# Patient Record
Sex: MALE | Race: WHITE | Employment: FULL TIME | URBAN - METROPOLITAN AREA
[De-identification: names, ages, dates, MRNs, and addresses within clinical notes are randomized per-mention and may not be internally consistent; named-entity substitution may affect disease eponyms.]

---

## 2018-10-26 ENCOUNTER — APPOINTMENT (OUTPATIENT)
Dept: GENERAL RADIOLOGY | Age: 58
DRG: 872 | End: 2018-10-26
Attending: STUDENT IN AN ORGANIZED HEALTH CARE EDUCATION/TRAINING PROGRAM
Payer: COMMERCIAL

## 2018-10-26 ENCOUNTER — HOSPITAL ENCOUNTER (INPATIENT)
Age: 58
LOS: 7 days | Discharge: HOME HEALTH | DRG: 872 | End: 2018-11-02
Attending: STUDENT IN AN ORGANIZED HEALTH CARE EDUCATION/TRAINING PROGRAM | Admitting: INTERNAL MEDICINE
Payer: COMMERCIAL

## 2018-10-26 DIAGNOSIS — M86.9 OSTEOMYELITIS OF RIGHT FOOT, UNSPECIFIED TYPE (HCC): Primary | ICD-10-CM

## 2018-10-26 PROBLEM — N17.9 ACUTE KIDNEY INJURY (HCC): Status: ACTIVE | Noted: 2018-10-26

## 2018-10-26 PROBLEM — E11.610 CHARCOT FOOT DUE TO DIABETES MELLITUS (HCC): Status: ACTIVE | Noted: 2018-10-26

## 2018-10-26 LAB
ALBUMIN SERPL-MCNC: 3.5 G/DL (ref 3.5–5)
ALBUMIN/GLOB SERPL: 0.7 {RATIO}
ALP SERPL-CCNC: 58 U/L (ref 50–136)
ALT SERPL-CCNC: 25 U/L (ref 12–65)
ANION GAP SERPL CALC-SCNC: 10 MMOL/L
AST SERPL-CCNC: 21 U/L (ref 15–37)
BASOPHILS # BLD: 0.1 K/UL (ref 0–0.2)
BASOPHILS NFR BLD: 0 % (ref 0–2)
BILIRUB SERPL-MCNC: 0.4 MG/DL (ref 0.2–1.1)
BUN SERPL-MCNC: 28 MG/DL (ref 6–23)
CALCIUM SERPL-MCNC: 9.9 MG/DL (ref 8.3–10.4)
CHLORIDE SERPL-SCNC: 102 MMOL/L (ref 98–107)
CO2 SERPL-SCNC: 23 MMOL/L (ref 21–32)
CREAT SERPL-MCNC: 2.19 MG/DL (ref 0.8–1.5)
DIFFERENTIAL METHOD BLD: ABNORMAL
EOSINOPHIL # BLD: 0.1 K/UL (ref 0–0.8)
EOSINOPHIL NFR BLD: 0 % (ref 0.5–7.8)
ERYTHROCYTE [DISTWIDTH] IN BLOOD BY AUTOMATED COUNT: 14.9 %
GLOBULIN SER CALC-MCNC: 5.2 G/DL (ref 2.3–3.5)
GLUCOSE BLD STRIP.AUTO-MCNC: 135 MG/DL (ref 65–100)
GLUCOSE SERPL-MCNC: 139 MG/DL (ref 65–100)
HCT VFR BLD AUTO: 37.7 % (ref 41.1–50.3)
HGB BLD-MCNC: 12.2 G/DL (ref 13.6–17.2)
IMM GRANULOCYTES # BLD: 0.1 K/UL (ref 0–0.5)
IMM GRANULOCYTES NFR BLD AUTO: 0 % (ref 0–5)
LYMPHOCYTES # BLD: 0.6 K/UL (ref 0.5–4.6)
LYMPHOCYTES NFR BLD: 5 % (ref 13–44)
MCH RBC QN AUTO: 27.3 PG (ref 26.1–32.9)
MCHC RBC AUTO-ENTMCNC: 32.4 G/DL (ref 31.4–35)
MCV RBC AUTO: 84.3 FL (ref 79.6–97.8)
MONOCYTES # BLD: 1 K/UL (ref 0.1–1.3)
MONOCYTES NFR BLD: 9 % (ref 4–12)
NEUTS SEG # BLD: 9.4 K/UL (ref 1.7–8.2)
NEUTS SEG NFR BLD: 85 % (ref 43–78)
NRBC # BLD: 0 K/UL (ref 0–0.2)
PLATELET # BLD AUTO: 336 K/UL (ref 150–450)
PMV BLD AUTO: 9.3 FL (ref 9.4–12.3)
POTASSIUM SERPL-SCNC: 4.4 MMOL/L (ref 3.5–5.1)
PROT SERPL-MCNC: 8.7 G/DL
RBC # BLD AUTO: 4.47 M/UL (ref 4.23–5.6)
SODIUM SERPL-SCNC: 135 MMOL/L (ref 136–145)
WBC # BLD AUTO: 11.2 K/UL (ref 4.3–11.1)

## 2018-10-26 PROCEDURE — 85025 COMPLETE CBC W/AUTO DIFF WBC: CPT

## 2018-10-26 PROCEDURE — 74011250636 HC RX REV CODE- 250/636: Performed by: EMERGENCY MEDICINE

## 2018-10-26 PROCEDURE — 82962 GLUCOSE BLOOD TEST: CPT

## 2018-10-26 PROCEDURE — 80053 COMPREHEN METABOLIC PANEL: CPT

## 2018-10-26 PROCEDURE — 74011000258 HC RX REV CODE- 258: Performed by: STUDENT IN AN ORGANIZED HEALTH CARE EDUCATION/TRAINING PROGRAM

## 2018-10-26 PROCEDURE — 99284 EMERGENCY DEPT VISIT MOD MDM: CPT | Performed by: STUDENT IN AN ORGANIZED HEALTH CARE EDUCATION/TRAINING PROGRAM

## 2018-10-26 PROCEDURE — 77030020263 HC SOL INJ SOD CL0.9% LFCR 1000ML

## 2018-10-26 PROCEDURE — 96375 TX/PRO/DX INJ NEW DRUG ADDON: CPT | Performed by: STUDENT IN AN ORGANIZED HEALTH CARE EDUCATION/TRAINING PROGRAM

## 2018-10-26 PROCEDURE — 65270000029 HC RM PRIVATE

## 2018-10-26 PROCEDURE — 87077 CULTURE AEROBIC IDENTIFY: CPT

## 2018-10-26 PROCEDURE — 87040 BLOOD CULTURE FOR BACTERIA: CPT

## 2018-10-26 PROCEDURE — 81003 URINALYSIS AUTO W/O SCOPE: CPT | Performed by: STUDENT IN AN ORGANIZED HEALTH CARE EDUCATION/TRAINING PROGRAM

## 2018-10-26 PROCEDURE — 74011636637 HC RX REV CODE- 636/637: Performed by: INTERNAL MEDICINE

## 2018-10-26 PROCEDURE — 77030032490 HC SLV COMPR SCD KNE COVD -B

## 2018-10-26 PROCEDURE — 74011250636 HC RX REV CODE- 250/636: Performed by: STUDENT IN AN ORGANIZED HEALTH CARE EDUCATION/TRAINING PROGRAM

## 2018-10-26 PROCEDURE — 96361 HYDRATE IV INFUSION ADD-ON: CPT | Performed by: STUDENT IN AN ORGANIZED HEALTH CARE EDUCATION/TRAINING PROGRAM

## 2018-10-26 PROCEDURE — 96365 THER/PROPH/DIAG IV INF INIT: CPT | Performed by: STUDENT IN AN ORGANIZED HEALTH CARE EDUCATION/TRAINING PROGRAM

## 2018-10-26 PROCEDURE — 74011250636 HC RX REV CODE- 250/636: Performed by: INTERNAL MEDICINE

## 2018-10-26 PROCEDURE — 74011250637 HC RX REV CODE- 250/637: Performed by: INTERNAL MEDICINE

## 2018-10-26 PROCEDURE — 87186 SC STD MICRODIL/AGAR DIL: CPT

## 2018-10-26 PROCEDURE — 87205 SMEAR GRAM STAIN: CPT

## 2018-10-26 PROCEDURE — 73630 X-RAY EXAM OF FOOT: CPT

## 2018-10-26 RX ORDER — VALSARTAN 320 MG/1
300 TABLET ORAL
Status: DISCONTINUED | OUTPATIENT
Start: 2018-10-26 | End: 2018-10-26 | Stop reason: SDUPTHER

## 2018-10-26 RX ORDER — LEVOTHYROXINE SODIUM 75 UG/1
75 TABLET ORAL
Status: DISCONTINUED | OUTPATIENT
Start: 2018-10-27 | End: 2018-11-02 | Stop reason: HOSPADM

## 2018-10-26 RX ORDER — INSULIN LISPRO 100 [IU]/ML
INJECTION, SOLUTION INTRAVENOUS; SUBCUTANEOUS
Status: DISCONTINUED | OUTPATIENT
Start: 2018-10-26 | End: 2018-11-02 | Stop reason: HOSPADM

## 2018-10-26 RX ORDER — SODIUM CHLORIDE 0.9 % (FLUSH) 0.9 %
5-10 SYRINGE (ML) INJECTION EVERY 8 HOURS
Status: DISCONTINUED | OUTPATIENT
Start: 2018-10-26 | End: 2018-11-02 | Stop reason: HOSPADM

## 2018-10-26 RX ORDER — IRBESARTAN 300 MG/1
300 TABLET ORAL
COMMUNITY

## 2018-10-26 RX ORDER — HYDROCHLOROTHIAZIDE 25 MG/1
25 TABLET ORAL DAILY
COMMUNITY

## 2018-10-26 RX ORDER — FENOFIBRATE 160 MG/1
160 TABLET ORAL
Status: DISCONTINUED | OUTPATIENT
Start: 2018-10-27 | End: 2018-11-02 | Stop reason: HOSPADM

## 2018-10-26 RX ORDER — GUAIFENESIN 100 MG/5ML
81 LIQUID (ML) ORAL DAILY
Status: DISCONTINUED | OUTPATIENT
Start: 2018-10-27 | End: 2018-11-02 | Stop reason: HOSPADM

## 2018-10-26 RX ORDER — CALCIUM CARBONATE 200(500)MG
200 TABLET,CHEWABLE ORAL
Status: DISCONTINUED | OUTPATIENT
Start: 2018-10-26 | End: 2018-11-02 | Stop reason: HOSPADM

## 2018-10-26 RX ORDER — VANCOMYCIN 2 GRAM/500 ML IN 0.9 % SODIUM CHLORIDE INTRAVENOUS
2000 EVERY 12 HOURS
Status: DISCONTINUED | OUTPATIENT
Start: 2018-10-27 | End: 2018-10-31

## 2018-10-26 RX ORDER — VALSARTAN 320 MG/1
320 TABLET ORAL
Status: DISCONTINUED | OUTPATIENT
Start: 2018-10-26 | End: 2018-11-02 | Stop reason: HOSPADM

## 2018-10-26 RX ORDER — AMLODIPINE BESYLATE 10 MG/1
TABLET ORAL DAILY
COMMUNITY

## 2018-10-26 RX ORDER — SODIUM CHLORIDE 0.9 % (FLUSH) 0.9 %
5-10 SYRINGE (ML) INJECTION AS NEEDED
Status: DISCONTINUED | OUTPATIENT
Start: 2018-10-26 | End: 2018-11-02 | Stop reason: HOSPADM

## 2018-10-26 RX ORDER — PANTOPRAZOLE SODIUM 40 MG/1
40 TABLET, DELAYED RELEASE ORAL
Status: DISCONTINUED | OUTPATIENT
Start: 2018-10-27 | End: 2018-11-02 | Stop reason: HOSPADM

## 2018-10-26 RX ORDER — LEVOTHYROXINE SODIUM 75 UG/1
75 TABLET ORAL
COMMUNITY

## 2018-10-26 RX ORDER — ACETAMINOPHEN 325 MG/1
650 TABLET ORAL
Status: DISCONTINUED | OUTPATIENT
Start: 2018-10-26 | End: 2018-11-02 | Stop reason: HOSPADM

## 2018-10-26 RX ORDER — ONDANSETRON 2 MG/ML
4 INJECTION INTRAMUSCULAR; INTRAVENOUS
Status: DISCONTINUED | OUTPATIENT
Start: 2018-10-26 | End: 2018-11-02 | Stop reason: HOSPADM

## 2018-10-26 RX ORDER — SODIUM CHLORIDE 9 MG/ML
125 INJECTION, SOLUTION INTRAVENOUS CONTINUOUS
Status: DISCONTINUED | OUTPATIENT
Start: 2018-10-26 | End: 2018-10-30

## 2018-10-26 RX ORDER — AMLODIPINE BESYLATE 10 MG/1
10 TABLET ORAL DAILY
Status: DISCONTINUED | OUTPATIENT
Start: 2018-10-27 | End: 2018-11-02 | Stop reason: HOSPADM

## 2018-10-26 RX ADMIN — SODIUM CHLORIDE 500 ML: 900 INJECTION, SOLUTION INTRAVENOUS at 17:18

## 2018-10-26 RX ADMIN — SODIUM CHLORIDE 1000 ML: 900 INJECTION, SOLUTION INTRAVENOUS at 19:13

## 2018-10-26 RX ADMIN — CEFTRIAXONE SODIUM 2 G: 2 INJECTION, POWDER, FOR SOLUTION INTRAMUSCULAR; INTRAVENOUS at 18:49

## 2018-10-26 RX ADMIN — INSULIN HUMAN 7 UNITS: 100 INJECTION, SOLUTION PARENTERAL at 22:14

## 2018-10-26 RX ADMIN — VANCOMYCIN HYDROCHLORIDE 2500 MG: 10 INJECTION, POWDER, LYOPHILIZED, FOR SOLUTION INTRAVENOUS at 19:15

## 2018-10-26 RX ADMIN — INSULIN HUMAN 15 UNITS: 100 INJECTION, SUSPENSION SUBCUTANEOUS at 22:15

## 2018-10-26 RX ADMIN — VALSARTAN 320 MG: 320 TABLET, FILM COATED ORAL at 21:53

## 2018-10-26 RX ADMIN — ACETAMINOPHEN 650 MG: 325 TABLET ORAL at 22:17

## 2018-10-26 RX ADMIN — SODIUM CHLORIDE 75 ML/HR: 900 INJECTION, SOLUTION INTRAVENOUS at 22:07

## 2018-10-26 RX ADMIN — CALCIUM CARBONATE 200 MG: 500 TABLET, CHEWABLE ORAL at 22:17

## 2018-10-26 NOTE — ED NOTES
TRANSFER - OUT REPORT: 
 
Verbal report given to Perri KEYS on Osmar Larios  being transferred to [facility] for routine progression of care Report consisted of patients Situation, Background, Assessment and  
Recommendations(SBAR). Information from the following report(s) SBAR, ED Summary and MAR was reviewed with the receiving nurse. Lines:    
 
Opportunity for questions and clarification was provided. Patient transported with: 
 Registered Nurse
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
## 2018-10-26 NOTE — ED TRIAGE NOTES
Pt states he has a wound on his right foot, states he has been on abx since May, now having chills, ankle/foot swelling, foot pain.

## 2018-10-27 ENCOUNTER — APPOINTMENT (OUTPATIENT)
Dept: MRI IMAGING | Age: 58
DRG: 872 | End: 2018-10-27
Attending: NURSE PRACTITIONER
Payer: COMMERCIAL

## 2018-10-27 PROBLEM — N17.9 ACUTE ON CHRONIC RENAL FAILURE (HCC): Status: ACTIVE | Noted: 2018-10-27

## 2018-10-27 PROBLEM — N17.9 ACUTE KIDNEY INJURY (HCC): Status: RESOLVED | Noted: 2018-10-26 | Resolved: 2018-10-27

## 2018-10-27 PROBLEM — N18.9 ACUTE ON CHRONIC RENAL FAILURE (HCC): Status: ACTIVE | Noted: 2018-10-27

## 2018-10-27 LAB
ANION GAP SERPL CALC-SCNC: 10 MMOL/L
BUN SERPL-MCNC: 26 MG/DL (ref 6–23)
CALCIUM SERPL-MCNC: 8.9 MG/DL (ref 8.3–10.4)
CHLORIDE SERPL-SCNC: 106 MMOL/L (ref 98–107)
CO2 SERPL-SCNC: 22 MMOL/L (ref 21–32)
CREAT SERPL-MCNC: 1.97 MG/DL (ref 0.8–1.5)
ERYTHROCYTE [DISTWIDTH] IN BLOOD BY AUTOMATED COUNT: 15.1 %
GLUCOSE BLD STRIP.AUTO-MCNC: 239 MG/DL (ref 65–100)
GLUCOSE BLD STRIP.AUTO-MCNC: 240 MG/DL (ref 65–100)
GLUCOSE BLD STRIP.AUTO-MCNC: 258 MG/DL (ref 65–100)
GLUCOSE SERPL-MCNC: 179 MG/DL (ref 65–100)
HCT VFR BLD AUTO: 32.7 % (ref 41.1–50.3)
HGB BLD-MCNC: 10.5 G/DL (ref 13.6–17.2)
MCH RBC QN AUTO: 27.3 PG (ref 26.1–32.9)
MCHC RBC AUTO-ENTMCNC: 32.1 G/DL (ref 31.4–35)
MCV RBC AUTO: 85.2 FL (ref 79.6–97.8)
NRBC # BLD: 0 K/UL (ref 0–0.2)
PLATELET # BLD AUTO: 268 K/UL (ref 150–450)
PMV BLD AUTO: 9.4 FL (ref 9.4–12.3)
POTASSIUM SERPL-SCNC: 4.3 MMOL/L (ref 3.5–5.1)
RBC # BLD AUTO: 3.84 M/UL (ref 4.23–5.6)
SODIUM SERPL-SCNC: 138 MMOL/L (ref 136–145)
WBC # BLD AUTO: 13.6 K/UL (ref 4.3–11.1)

## 2018-10-27 PROCEDURE — 74011636637 HC RX REV CODE- 636/637: Performed by: INTERNAL MEDICINE

## 2018-10-27 PROCEDURE — 74011250636 HC RX REV CODE- 250/636: Performed by: INTERNAL MEDICINE

## 2018-10-27 PROCEDURE — 77030020263 HC SOL INJ SOD CL0.9% LFCR 1000ML

## 2018-10-27 PROCEDURE — 80048 BASIC METABOLIC PNL TOTAL CA: CPT

## 2018-10-27 PROCEDURE — 73720 MRI LWR EXTREMITY W/O&W/DYE: CPT

## 2018-10-27 PROCEDURE — 36415 COLL VENOUS BLD VENIPUNCTURE: CPT

## 2018-10-27 PROCEDURE — 65270000029 HC RM PRIVATE

## 2018-10-27 PROCEDURE — 85027 COMPLETE CBC AUTOMATED: CPT

## 2018-10-27 PROCEDURE — A9575 INJ GADOTERATE MEGLUMI 0.1ML: HCPCS | Performed by: INTERNAL MEDICINE

## 2018-10-27 PROCEDURE — 74011250637 HC RX REV CODE- 250/637: Performed by: INTERNAL MEDICINE

## 2018-10-27 PROCEDURE — 74011000258 HC RX REV CODE- 258: Performed by: INTERNAL MEDICINE

## 2018-10-27 PROCEDURE — 82962 GLUCOSE BLOOD TEST: CPT

## 2018-10-27 RX ORDER — GADOTERATE MEGLUMINE 376.9 MG/ML
30 INJECTION INTRAVENOUS
Status: COMPLETED | OUTPATIENT
Start: 2018-10-27 | End: 2018-10-27

## 2018-10-27 RX ORDER — HEPARIN SODIUM 5000 [USP'U]/ML
5000 INJECTION, SOLUTION INTRAVENOUS; SUBCUTANEOUS EVERY 8 HOURS
Status: DISCONTINUED | OUTPATIENT
Start: 2018-10-27 | End: 2018-11-02 | Stop reason: HOSPADM

## 2018-10-27 RX ORDER — SODIUM CHLORIDE 0.9 % (FLUSH) 0.9 %
10 SYRINGE (ML) INJECTION
Status: COMPLETED | OUTPATIENT
Start: 2018-10-27 | End: 2018-10-27

## 2018-10-27 RX ORDER — HYDRALAZINE HYDROCHLORIDE 20 MG/ML
20 INJECTION INTRAMUSCULAR; INTRAVENOUS
Status: DISCONTINUED | OUTPATIENT
Start: 2018-10-27 | End: 2018-11-02 | Stop reason: HOSPADM

## 2018-10-27 RX ADMIN — HEPARIN SODIUM 5000 UNITS: 5000 INJECTION INTRAVENOUS; SUBCUTANEOUS at 17:50

## 2018-10-27 RX ADMIN — FENOFIBRATE 160 MG: 160 TABLET ORAL at 09:04

## 2018-10-27 RX ADMIN — Medication 10 ML: at 17:12

## 2018-10-27 RX ADMIN — CEFTRIAXONE SODIUM 2 G: 2 INJECTION, POWDER, FOR SOLUTION INTRAMUSCULAR; INTRAVENOUS at 18:01

## 2018-10-27 RX ADMIN — AMLODIPINE BESYLATE 10 MG: 10 TABLET ORAL at 09:04

## 2018-10-27 RX ADMIN — PANTOPRAZOLE SODIUM 40 MG: 40 TABLET, DELAYED RELEASE ORAL at 09:04

## 2018-10-27 RX ADMIN — SODIUM CHLORIDE 125 ML/HR: 900 INJECTION, SOLUTION INTRAVENOUS at 19:36

## 2018-10-27 RX ADMIN — VALSARTAN 320 MG: 320 TABLET, FILM COATED ORAL at 20:45

## 2018-10-27 RX ADMIN — VANCOMYCIN HYDROCHLORIDE 2000 MG: 10 INJECTION, POWDER, LYOPHILIZED, FOR SOLUTION INTRAVENOUS at 09:01

## 2018-10-27 RX ADMIN — HEPARIN SODIUM 5000 UNITS: 5000 INJECTION INTRAVENOUS; SUBCUTANEOUS at 09:41

## 2018-10-27 RX ADMIN — ASPIRIN 81 MG CHEWABLE TABLET 81 MG: 81 TABLET CHEWABLE at 09:04

## 2018-10-27 RX ADMIN — INSULIN LISPRO 4 UNITS: 100 INJECTION, SOLUTION INTRAVENOUS; SUBCUTANEOUS at 17:50

## 2018-10-27 RX ADMIN — INSULIN HUMAN 35 UNITS: 100 INJECTION, SUSPENSION SUBCUTANEOUS at 17:50

## 2018-10-27 RX ADMIN — LEVOTHYROXINE SODIUM 75 MCG: 75 TABLET ORAL at 09:04

## 2018-10-27 RX ADMIN — INSULIN HUMAN 35 UNITS: 100 INJECTION, SUSPENSION SUBCUTANEOUS at 09:30

## 2018-10-27 RX ADMIN — SODIUM CHLORIDE 125 ML/HR: 900 INJECTION, SOLUTION INTRAVENOUS at 12:14

## 2018-10-27 RX ADMIN — VANCOMYCIN HYDROCHLORIDE 2000 MG: 10 INJECTION, POWDER, LYOPHILIZED, FOR SOLUTION INTRAVENOUS at 19:39

## 2018-10-27 RX ADMIN — GADOTERATE MEGLUMINE 30 ML: 376.9 INJECTION INTRAVENOUS at 17:12

## 2018-10-27 RX ADMIN — INSULIN LISPRO 4 UNITS: 100 INJECTION, SOLUTION INTRAVENOUS; SUBCUTANEOUS at 20:46

## 2018-10-27 RX ADMIN — INSULIN LISPRO 6 UNITS: 100 INJECTION, SOLUTION INTRAVENOUS; SUBCUTANEOUS at 11:38

## 2018-10-27 NOTE — PROGRESS NOTES
Orthopaedic NP made rounds this morning. Changed diet to consistent carb diet. Referral sent for wound care.

## 2018-10-27 NOTE — PROGRESS NOTES
TRANSFER - IN REPORT: 
 
Verbal report received from Arlene Waddell RN on Yu Mims  being received from ER(unit) for routine progression of care Report consisted of patients Situation, Background, Assessment and  
Recommendations(SBAR). Information from the following report(s) ED Summary, Intake/Output and Recent Results was reviewed with the receiving nurse. Opportunity for questions and clarification was provided. Assessment completed upon patients arrival to unit and care assumed.
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
## 2018-10-27 NOTE — PROGRESS NOTES
Report called from Radha Benavidez, [facility] from emergency room. Patient to be transported soon.

## 2018-10-27 NOTE — PROGRESS NOTES
Patient resting in bed. Alert and oriented x4. PIV infusing fluids and antibiotics. NPO except meds with sips of water. Lung sounds clear. Bowel sounds active. Right foot has a wound that's draining serosanguinous fluid. Dressing placed on right with 4x4 and kerlix. No needs at this time. Bed is low and locked. Call light within reach. Instructed to call for assistance.

## 2018-10-27 NOTE — PROGRESS NOTES
Hospitalist Progress Note Admit Date:  10/26/2018  5:05 PM  
Name:  Tuyet Street Age:  62 y.o. 
:  1960 MRN:  [id number] PCP:  Sourav Dickinson MD 
Treatment Team: Attending Provider: Tej Jean DO; Consulting Provider: Grecia Parsons MD 
 
Subjective:  
Patient pleasant 58M with pmhx of DM, HTN, MO, sleep apnea, hypothyroidism presented with less than one day of subjective fever, chills, and increased right foot swelling and redness. Says he felt in his normal state of health until when he checked fever at home of 100. Patient with hx of Charcot foot and chronic ulceration, follows with outpatient Wound care, Dr Jennyfer Barreto and Dr Rogelio Jackson. Has been on bactrim for several months. XR  With significant bone destruction of midfoot and hindfoot. Was given vanco/Rocephin and cultured. Hospitalist asked to admit for sepsis, diabetic foot ulceration/suspected osteomyelelitis. 10/27 - pt denies complaints except for foot pain when he walks. No CP, SOB, fevers. Objective:  
 
Patient Vitals for the past 24 hrs: 
 Temp Pulse Resp BP SpO2  
10/27/18 0300 98.1 °F (36.7 °C) 80 18 162/81 98 % 10/27/18 0047 97.4 °F (36.3 °C) 75 18 131/73 95 % 10/26/18 2012 99.3 °F (37.4 °C) 94 18 167/78 94 % 10/26/18 1906  (!) 108 18 147/59 93 % 10/26/18 1713 99.6 °F (37.6 °C) (!) 112 22 159/76 97 % Oxygen Therapy O2 Sat (%): 98 % (10/27/18 0300) Pulse via Oximetry: 108 beats per minute (10/26/18 190) O2 Device: Room air (10/26/18 1906) Intake/Output Summary (Last 24 hours) at 10/27/2018 5674 Last data filed at 10/27/2018 2551 Gross per 24 hour Intake 824 ml Output 1300 ml Net -476 ml General:    Well nourished. Alert. CV:   RRR. No murmur, rub, or gallop. Lungs:   CTAB. No wheezing, rhonchi, or rales. Extremities: Warm and dry. No cyanosis. Chronic stasis edema with derm changes. R foot bandaged Skin:     No rashes or jaundice. Neuro:  No gross focal deficits Data Review: 
I have reviewed all labs, meds, telemetry events, and studies from the last 24 hours: 
 
Recent Results (from the past 24 hour(s)) CBC WITH AUTOMATED DIFF Collection Time: 10/26/18  5:46 PM  
Result Value Ref Range WBC 11.2 (H) 4.3 - 11.1 K/uL  
 RBC 4.47 4.23 - 5.6 M/uL  
 HGB 12.2 (L) 13.6 - 17.2 g/dL HCT 37.7 (L) 41.1 - 50.3 % MCV 84.3 79.6 - 97.8 FL  
 MCH 27.3 26.1 - 32.9 PG  
 MCHC 32.4 31.4 - 35.0 g/dL  
 RDW 14.9 % PLATELET 295 065 - 653 K/uL MPV 9.3 (L) 9.4 - 12.3 FL ABSOLUTE NRBC 0.00 0.0 - 0.2 K/uL  
 DF AUTOMATED NEUTROPHILS 85 (H) 43 - 78 % LYMPHOCYTES 5 (L) 13 - 44 % MONOCYTES 9 4.0 - 12.0 % EOSINOPHILS 0 (L) 0.5 - 7.8 % BASOPHILS 0 0.0 - 2.0 % IMMATURE GRANULOCYTES 0 0.0 - 5.0 %  
 ABS. NEUTROPHILS 9.4 (H) 1.7 - 8.2 K/UL  
 ABS. LYMPHOCYTES 0.6 0.5 - 4.6 K/UL  
 ABS. MONOCYTES 1.0 0.1 - 1.3 K/UL  
 ABS. EOSINOPHILS 0.1 0.0 - 0.8 K/UL  
 ABS. BASOPHILS 0.1 0.0 - 0.2 K/UL  
 ABS. IMM. GRANS. 0.1 0.0 - 0.5 K/UL METABOLIC PANEL, COMPREHENSIVE Collection Time: 10/26/18  5:46 PM  
Result Value Ref Range Sodium 135 (L) 136 - 145 mmol/L Potassium 4.4 3.5 - 5.1 mmol/L Chloride 102 98 - 107 mmol/L  
 CO2 23 21 - 32 mmol/L Anion gap 10 mmol/L Glucose 139 (H) 65 - 100 mg/dL BUN 28 (H) 6 - 23 MG/DL Creatinine 2.19 (H) 0.8 - 1.5 MG/DL  
 GFR est AA 40 (L) >60 ml/min/1.73m2 GFR est non-AA 33 ml/min/1.73m2 Calcium 9.9 8.3 - 10.4 MG/DL Bilirubin, total 0.4 0.2 - 1.1 MG/DL  
 ALT (SGPT) 25 12 - 65 U/L  
 AST (SGOT) 21 15 - 37 U/L Alk. phosphatase 58 50 - 136 U/L Protein, total 8.7 g/dL Albumin 3.5 3.5 - 5.0 g/dL Globulin 5.2 (H) 2.3 - 3.5 g/dL A-G Ratio 0.7 CULTURE, BLOOD Collection Time: 10/26/18  5:46 PM  
Result Value Ref Range Special Requests: RIGHT ANTECUBITAL Culture result: NO GROWTH AFTER 13 HOURS    
CULTURE, BLOOD  Collection Time: 10/26/18  5:46 PM  
 Result Value Ref Range Special Requests: RIGHT FOREARM Culture result: NO GROWTH AFTER 13 HOURS    
GLUCOSE, POC Collection Time: 10/26/18  8:33 PM  
Result Value Ref Range Glucose (POC) 135 (H) 65 - 100 mg/dL METABOLIC PANEL, BASIC Collection Time: 10/27/18  4:38 AM  
Result Value Ref Range Sodium 138 136 - 145 mmol/L Potassium 4.3 3.5 - 5.1 mmol/L Chloride 106 98 - 107 mmol/L  
 CO2 22 21 - 32 mmol/L Anion gap 10 mmol/L Glucose 179 (H) 65 - 100 mg/dL BUN 26 (H) 6 - 23 MG/DL Creatinine 1.97 (H) 0.8 - 1.5 MG/DL  
 GFR est AA 45 (L) >60 ml/min/1.73m2 GFR est non-AA 37 ml/min/1.73m2 Calcium 8.9 8.3 - 10.4 MG/DL  
CBC W/O DIFF Collection Time: 10/27/18  4:38 AM  
Result Value Ref Range WBC 13.6 (H) 4.3 - 11.1 K/uL  
 RBC 3.84 (L) 4.23 - 5.6 M/uL  
 HGB 10.5 (L) 13.6 - 17.2 g/dL HCT 32.7 (L) 41.1 - 50.3 % MCV 85.2 79.6 - 97.8 FL  
 MCH 27.3 26.1 - 32.9 PG  
 MCHC 32.1 31.4 - 35.0 g/dL  
 RDW 15.1 % PLATELET 688 575 - 278 K/uL MPV 9.4 9.4 - 12.3 FL ABSOLUTE NRBC 0.00 0.0 - 0.2 K/uL All Micro Results Procedure Component Value Units Date/Time CULTURE, BLOOD [[id number]] Collected:  10/26/18 1746 Order Status:  Completed Specimen:  Blood Updated:  10/27/18 8421 Special Requests: RIGHT ANTECUBITAL Culture result: NO GROWTH AFTER 13 HOURS     
 CULTURE, BLOOD [[id number]] Collected:  10/26/18 1746 Order Status:  Completed Specimen:  Blood Updated:  10/27/18 9667 Special Requests: RIGHT FOREARM Culture result: NO GROWTH AFTER 13 HOURS No results found for this visit on 10/26/18. Current Meds: 
Current Facility-Administered Medications Medication Dose Route Frequency  influenza vaccine 2018-19 (6 mos+)(PF) (FLUARIX QUAD/FLULAVAL QUAD) injection 0.5 mL  0.5 mL IntraMUSCular PRIOR TO DISCHARGE  hydrALAZINE (APRESOLINE) 20 mg/mL injection 20 mg  20 mg IntraVENous Q4H PRN  
  tuberculin injection 5 Units  5 Units IntraDERMal ONCE  
 heparin (porcine) injection 5,000 Units  5,000 Units SubCUTAneous Q8H  
 amLODIPine (NORVASC) tablet 10 mg  10 mg Oral DAILY  aspirin chewable tablet 81 mg  81 mg Oral DAILY  fenofibrate (LOFIBRA) tablet 160 mg  160 mg Oral 7am  
 levothyroxine (SYNTHROID) tablet 75 mcg  75 mcg Oral ACB  pantoprazole (PROTONIX) tablet 40 mg  40 mg Oral ACB  insulin lispro (HUMALOG) injection   SubCUTAneous AC&HS  sodium chloride (NS) flush 5-10 mL  5-10 mL IntraVENous Q8H  
 sodium chloride (NS) flush 5-10 mL  5-10 mL IntraVENous PRN  
 acetaminophen (TYLENOL) tablet 650 mg  650 mg Oral Q4H PRN  
 ondansetron (ZOFRAN) injection 4 mg  4 mg IntraVENous Q4H PRN  
 0.9% sodium chloride infusion  125 mL/hr IntraVENous CONTINUOUS  
 insulin NPH (NOVOLIN N, HUMULIN N) injection 35 Units  35 Units SubCUTAneous ACB&D And  
 insulin regular (NOVOLIN R, HUMULIN R) injection 15 Units  15 Units SubCUTAneous ACB&D  cefTRIAXone (ROCEPHIN) 2 g in 0.9% sodium chloride (MBP/ADV) 50 mL  2 g IntraVENous Q24H  calcium carbonate (TUMS) chewable tablet 200 mg [elemental]  200 mg Oral TID PRN  
 valsartan (DIOVAN) tablet 320 mg  320 mg Oral QHS  vancomycin (VANCOCIN) 2000 mg in  ml infusion  2,000 mg IntraVENous Q12H Other Studies (last 24 hours): Xr Foot Rt Min 3 V Result Date: 10/26/2018 Right Foot INDICATION: Right foot pain, wound, diabetic Three views of the right foot were obtained FINDINGS: There is marked destruction and deformity of the midfoot and adjacent hindfoot. There is consistent with a neuropathic process, possibly with associated osteomyelitis. The talus is disc placed medially with tibia articulating directly with the calcaneus. There is partial destruction of the cuneiforms, cuboid, talus, and calcaneus. IMPRESSION: Significant bone destruction in the midfoot and hindfoot. Assessment and Plan: Hospital Problems as of 10/27/2018 Date Reviewed: 10/27/2018 Codes Class Noted - Resolved POA Acute on chronic renal failure (HCC) ICD-10-CM: N17.9, N18.9 ICD-9-CM: 584.9, 585.9  10/27/2018 - Present Yes Charcot foot due to diabetes mellitus ([facility] ICD-10-CM: E11.610 ICD-9-CM: 250.60, 713.5  10/26/2018 - Present Yes Osteomyelitis ([facility] ICD-10-CM: M86.9 ICD-9-CM: 730.20  7/22/2015 - Present Yes * (Principal) Sepsis ([facility] ICD-10-CM: A41.9 ICD-9-CM: 038.9, 995.91  7/22/2015 - Present Yes DM type 2 (diabetes mellitus, type 2) ([facility]) (Chronic) ICD-10-CM: E11.9 ICD-9-CM: 250.00  7/22/2015 - Present Yes RESOLVED: Acute kidney injury ([facility] ICD-10-CM: N17.9 ICD-9-CM: 584.9  10/26/2018 - 10/27/2018 Plan: 
Sepsis due to DM R foot infection with possible osteo -  
· Cont vanc/rocephin for now. Ortho consulted. · Is NPO in case of procedure · Follow cultures AoCKD · Cont IVF. Is NPO. Increase today. Monitor. DM2 
· Cont home basal 70/30 regimen and ISS 
 
HTN 
· Monitor. Cont home meds. May need tighter control DC planning/Dispo:  PPD ordered. Diet:  DIET NPO 
DVT ppx:  heparin Signed: 
Jayson Rooney MD
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
## 2018-10-27 NOTE — PROGRESS NOTES
Pharmacokinetic Consult to Pharmacist 
 
Tonie Delaney is a 62 y.o. male being treated for right diabetic foot infection with ceftriaxone and vancomycin. Height: 6' (182.9 cm)  Weight: (!) 172.4 kg (380 lb) Lab Results Component Value Date/Time BUN 26 (H) 10/27/2018 04:38 AM  
 Creatinine 1.97 (H) 10/27/2018 04:38 AM  
 WBC 13.6 (H) 10/27/2018 04:38 AM  
 Lactic acid 1.8 07/21/2015 10:44 PM  
  
Estimated Creatinine Clearance: 66.8 mL/min (A) (based on SCr of 1.97 mg/dL (H)). CULTURES: 
10/26 :  BC x 2 - NG 
 
 
 
Day 2 of vancomycin. Goal trough is 15-20. [facility] initiated therapy with vancomycin 2500mg x 1 followed by vancomycin 2g q12h. We will continue to follow the  Patient and adjust the dose as necessary per guidelines. Thank you, Jonah Pelayo, OlegD

## 2018-10-27 NOTE — PROGRESS NOTES
62 M dx sepsis, diabetic foot ulceration. hx of Charcot foot and chronic ulceration, follows with outpatient Wound care, Dr Venkat Mccrary and Dr Elise Dakins. On IV ABX.

## 2018-10-27 NOTE — PROGRESS NOTES
[facility] paged for consult to orthopedic, Dr. Angel Hernandez will be making rounds in the morning to see patient.

## 2018-10-27 NOTE — CONSULTS
ORTHO:    CONSULT RECEIVED    PATIENT WITH HISTORY OF CHARCOT JOINT, DIABETIC ULERATONS    TREATED BY DR Janice Teixeira AND DR GUERRERO AS OUTPATIENT AT WOUND CENTER    XRAYS REVIEWED     MRII RIGHT FOOT ORDERED

## 2018-10-27 NOTE — PROGRESS NOTES
Skin assessment completed with Mercedes Glynn RN. Patient has right foot deformity. Right midfoot and hindfoot ulceration with serosangenous drainage, surrounded by erythema and swelling. Left foot has 4th and 5th toes amputated.

## 2018-10-28 PROBLEM — E11.610 CHARCOT FOOT DUE TO DIABETES MELLITUS (HCC): Chronic | Status: ACTIVE | Noted: 2018-10-26

## 2018-10-28 LAB
GLUCOSE BLD STRIP.AUTO-MCNC: 123 MG/DL (ref 65–100)
GLUCOSE BLD STRIP.AUTO-MCNC: 130 MG/DL (ref 65–100)
GLUCOSE BLD STRIP.AUTO-MCNC: 174 MG/DL (ref 65–100)
GLUCOSE BLD STRIP.AUTO-MCNC: 214 MG/DL (ref 65–100)
VANCOMYCIN TROUGH SERPL-MCNC: 16 UG/ML (ref 5–20)

## 2018-10-28 PROCEDURE — 36415 COLL VENOUS BLD VENIPUNCTURE: CPT

## 2018-10-28 PROCEDURE — 74011250636 HC RX REV CODE- 250/636: Performed by: INTERNAL MEDICINE

## 2018-10-28 PROCEDURE — 80202 ASSAY OF VANCOMYCIN: CPT

## 2018-10-28 PROCEDURE — 77030020263 HC SOL INJ SOD CL0.9% LFCR 1000ML

## 2018-10-28 PROCEDURE — 74011250637 HC RX REV CODE- 250/637: Performed by: INTERNAL MEDICINE

## 2018-10-28 PROCEDURE — 65270000029 HC RM PRIVATE

## 2018-10-28 PROCEDURE — 74011636637 HC RX REV CODE- 636/637: Performed by: INTERNAL MEDICINE

## 2018-10-28 PROCEDURE — 82962 GLUCOSE BLOOD TEST: CPT

## 2018-10-28 PROCEDURE — 87040 BLOOD CULTURE FOR BACTERIA: CPT

## 2018-10-28 PROCEDURE — 74011000258 HC RX REV CODE- 258: Performed by: INTERNAL MEDICINE

## 2018-10-28 RX ORDER — INSULIN LISPRO 100 [IU]/ML
30 INJECTION, SOLUTION INTRAVENOUS; SUBCUTANEOUS
Status: DISCONTINUED | OUTPATIENT
Start: 2018-10-28 | End: 2018-10-29

## 2018-10-28 RX ORDER — INSULIN GLARGINE 100 [IU]/ML
50 INJECTION, SOLUTION SUBCUTANEOUS DAILY
Status: DISCONTINUED | OUTPATIENT
Start: 2018-10-28 | End: 2018-10-29

## 2018-10-28 RX ADMIN — HEPARIN SODIUM 5000 UNITS: 5000 INJECTION INTRAVENOUS; SUBCUTANEOUS at 01:22

## 2018-10-28 RX ADMIN — FENOFIBRATE 160 MG: 160 TABLET ORAL at 06:45

## 2018-10-28 RX ADMIN — SODIUM CHLORIDE 125 ML/HR: 900 INJECTION, SOLUTION INTRAVENOUS at 06:44

## 2018-10-28 RX ADMIN — Medication 5 ML: at 22:34

## 2018-10-28 RX ADMIN — LEVOTHYROXINE SODIUM 75 MCG: 75 TABLET ORAL at 06:45

## 2018-10-28 RX ADMIN — INSULIN LISPRO 30 UNITS: 100 INJECTION, SOLUTION INTRAVENOUS; SUBCUTANEOUS at 18:20

## 2018-10-28 RX ADMIN — HEPARIN SODIUM 5000 UNITS: 5000 INJECTION INTRAVENOUS; SUBCUTANEOUS at 08:01

## 2018-10-28 RX ADMIN — VANCOMYCIN HYDROCHLORIDE 2000 MG: 10 INJECTION, POWDER, LYOPHILIZED, FOR SOLUTION INTRAVENOUS at 20:03

## 2018-10-28 RX ADMIN — INSULIN LISPRO 30 UNITS: 100 INJECTION, SOLUTION INTRAVENOUS; SUBCUTANEOUS at 12:18

## 2018-10-28 RX ADMIN — HEPARIN SODIUM 5000 UNITS: 5000 INJECTION INTRAVENOUS; SUBCUTANEOUS at 18:19

## 2018-10-28 RX ADMIN — INSULIN GLARGINE 50 UNITS: 100 INJECTION, SOLUTION SUBCUTANEOUS at 12:17

## 2018-10-28 RX ADMIN — PANTOPRAZOLE SODIUM 40 MG: 40 TABLET, DELAYED RELEASE ORAL at 06:45

## 2018-10-28 RX ADMIN — VANCOMYCIN HYDROCHLORIDE 2000 MG: 10 INJECTION, POWDER, LYOPHILIZED, FOR SOLUTION INTRAVENOUS at 08:01

## 2018-10-28 RX ADMIN — SODIUM CHLORIDE 125 ML/HR: 900 INJECTION, SOLUTION INTRAVENOUS at 18:39

## 2018-10-28 RX ADMIN — INSULIN HUMAN 35 UNITS: 100 INJECTION, SUSPENSION SUBCUTANEOUS at 08:01

## 2018-10-28 RX ADMIN — INSULIN LISPRO 4 UNITS: 100 INJECTION, SOLUTION INTRAVENOUS; SUBCUTANEOUS at 12:18

## 2018-10-28 RX ADMIN — CEFTRIAXONE SODIUM 2 G: 2 INJECTION, POWDER, FOR SOLUTION INTRAMUSCULAR; INTRAVENOUS at 18:38

## 2018-10-28 RX ADMIN — HEPARIN SODIUM 5000 UNITS: 5000 INJECTION INTRAVENOUS; SUBCUTANEOUS at 23:32

## 2018-10-28 RX ADMIN — VALSARTAN 320 MG: 320 TABLET, FILM COATED ORAL at 21:48

## 2018-10-28 RX ADMIN — ASPIRIN 81 MG CHEWABLE TABLET 81 MG: 81 TABLET CHEWABLE at 08:02

## 2018-10-28 RX ADMIN — INSULIN LISPRO 2 UNITS: 100 INJECTION, SOLUTION INTRAVENOUS; SUBCUTANEOUS at 08:01

## 2018-10-28 RX ADMIN — AMLODIPINE BESYLATE 10 MG: 10 TABLET ORAL at 08:02

## 2018-10-28 NOTE — PROGRESS NOTES
House supervisor notified that nurse could not get ahold of wound care. Supervisor states wound care does not work on the weekends

## 2018-10-28 NOTE — PROGRESS NOTES
Care Management Interventions Mode of Transport at Discharge: Self Transition of Care Consult (CM Consult): Discharge Planning Current Support Network: Lives Alone Discharge Location Discharge Placement: Unable to determine at this time SW following to assist with d/c plans. Chart reviewed and patient interview attempted. Patient asleep so I will interview at a later time. Patient's prior SW note from hospitalization 3 yrs ago showed him living in a /[place]. He lived in [place], [place] but he travelled here to [facility] to work weekdays and would go back to [place] on weekends. [place] shows [place]. Pt was not  in 2015 and had no local family. Pt's mother and other family all live in [place]. Pt admited to being non compliant at that time and doing what he wants with most things in life. Bariatric crutches were arranged during that admission since he refused to use a walker due to the limited space in the [facility].  SW to follow for possible long term IVAB. Patient has pvt ins that may cover the cost if needed. Will follow. Gini Mo

## 2018-10-28 NOTE — PROGRESS NOTES
2018 Post Op day: * No surgery found * Admit Date: 10/26/2018 Admit Diagnosis: Osteomyelitis ([address] Principle Problem: Sepsis ([address] Subjective: Doing well, No complaints, No SOB, No Chest Pain, No Nausea or Vomiting Objective:  
Vital Signs are Stable, No Acute Distress, Alert and Oriented, Dressing is Dry,  Neurovascular exam is normal.  
 
Assessment / Plan : 
Patient Active Problem List  
Diagnosis Code  Osteomyelitis ([address] M86.9  Sepsis ([address] A41.9  DM type 2 (diabetes mellitus, type 2) ([address] E11.9  Charcot foot due to diabetes mellitus ([address] E11.610  
 Acute on chronic renal failure (HCC) N17.9, N18.9 Patient Vitals for the past 8 hrs: 
 BP Temp Pulse Resp SpO2  
10/28/18 0440 145/73 98.4 °F (36.9 °C) 83 18 96 % 10/28/18 0059 115/69 98.4 °F (36.9 °C) 81 18 93 % Temp (24hrs), Av.6 °F (37 °C), Min:97.3 °F (36.3 °C), Max:99.7 °F (37.6 °C) Body mass index is 51.54 kg/m². Lab Results Component Value Date/Time HGB 10.5 (L) 10/27/2018 04:38 AM  
  
Pt discussed with Supervising Physician Wound care has not seen the patient yet, unsure why they did not see him yesterday when we placed the consult. Re consult Signed By: ERVIN Yi 
10/28/2018,  8:48 AM

## 2018-10-28 NOTE — PROGRESS NOTES
Shift assessment completed. Patient alert and oriented x4. Patient in bed resting quietly while watching tv. No distress noted. Dressing to right foot is clean, dry and intact. Patient uses urinal to void. Incentive spirometry at bedside. Patient demonstrated use at 2500. Tolerated well. Bed low and locked. Call light within reach of patient. Patient instructed to call for assistance. Patient verbalized understanding. Will monitor.

## 2018-10-28 NOTE — PROGRESS NOTES
Hospitalist Progress Note Admit Date:  10/26/2018  5:05 PM  
Name:  Thurnell Fothergill Age:  62 y.o. 
:  1960 MRN:  [id number] PCP:  Estrella Montez MD 
Treatment Team: Attending Provider: Catrachita Kennedy DO; Consulting Provider: Cherylene Popp, MD; Utilization Review: Jose R Villa Subjective:  
Patient pleasant 58M with pmhx of DM, HTN, MO, sleep apnea, hypothyroidism presented with less than one day of subjective fever, chills, and increased right foot swelling and redness. Says he felt in his normal state of health until when he checked fever at home of 100. Patient with hx of Charcot foot and chronic ulceration, follows with outpatient Wound care, Dr Johan Madrid and Dr Melvin Vogel. Has been on bactrim for several months. XR  With significant bone destruction of midfoot and hindfoot. Was given vanco/Rocephin and cultured. Hospitalist asked to admit for sepsis, diabetic foot ulceration/suspected osteomyelelitis. MRI confirmed osteomyelitis and abscess. Ortho consulted. 10/28 - denies complaints. No  foot pain currently. No CP, SOB Objective:  
 
Patient Vitals for the past 24 hrs: 
 Temp Pulse Resp BP SpO2  
10/28/18 0939 98.8 °F (37.1 °C) 83 18 138/77 95 % 10/28/18 0440 98.4 °F (36.9 °C) 83 18 145/73 96 % 10/28/18 0059 98.4 °F (36.9 °C) 81 18 115/69 93 % 10/27/18 1937 99.7 °F (37.6 °C) 91 18 144/74 93 % 10/27/18 1553 97.3 °F (36.3 °C) 91 18 148/78 95 % 10/27/18 1110 99.1 °F (37.3 °C) 83 16 131/72 95 % Oxygen Therapy O2 Sat (%): 95 % (10/28/18 0939) Pulse via Oximetry: 108 beats per minute (10/26/18 1906) O2 Device: Room air (10/26/18 1906) Intake/Output Summary (Last 24 hours) at 10/28/2018 1054 Last data filed at 10/28/2018 1003 Gross per 24 hour Intake 1680 ml Output 1550 ml Net 130 ml General:    Well nourished. Alert. CV:   RRR. No murmur, rub, or gallop. Lungs:   CTAB. No wheezing, rhonchi, or rales. Extremities: Warm and dry. No cyanosis. Chronic stasis edema with derm changes. R foot bandaged Skin:     No rashes or jaundice. Neuro:  No gross focal deficits Data Review: 
I have reviewed all labs, meds, telemetry events, and studies from the last 24 hours: 
 
Recent Results (from the past 24 hour(s)) GLUCOSE, POC Collection Time: 10/27/18 11:18 AM  
Result Value Ref Range Glucose (POC) 258 (H) 65 - 100 mg/dL GLUCOSE, POC Collection Time: 10/27/18  5:49 PM  
Result Value Ref Range Glucose (POC) 240 (H) 65 - 100 mg/dL GLUCOSE, POC Collection Time: 10/27/18  8:44 PM  
Result Value Ref Range Glucose (POC) 239 (H) 65 - 100 mg/dL GLUCOSE, POC Collection Time: 10/28/18  6:51 AM  
Result Value Ref Range Glucose (POC) 174 (H) 65 - 100 mg/dL Ermalene Organ Collection Time: 10/28/18  7:02 AM  
Result Value Ref Range Vancomycin,trough 16.0 5 - 20 ug/mL All Micro Results Procedure Component Value Units Date/Time CULTURE, BLOOD [[id number]] Order Status:  Sent Specimen:  Blood CULTURE, BLOOD [[id number]] Order Status:  Sent Specimen:  Blood CULTURE, BLOOD [[id number]] Collected:  10/26/18 1746 Order Status:  Completed Specimen:  Blood Updated:  10/27/18 1402 Special Requests: RIGHT ANTECUBITAL     
  GRAM STAIN GRAM POSITIVE COCCI ANAEROBIC BOTTLE POSITIVE RESULTS VERIFIED, PHONED TO AND READ BACK BY  YRN KEYS 3RD [facility] AT [place] [id number] ON 10/27/18 [facility] Culture result:    
  CULTURE IN PROGRESS,FURTHER UPDATES TO FOLLOW CULTURE, BLOOD [[id number]] Collected:  10/26/18 1746 Order Status:  Completed Specimen:  Blood Updated:  10/27/18 1401 Special Requests: RIGHT FOREARM     
  GRAM STAIN GRAM POSITIVE COCCI ANAEROBIC BOTTLE POSITIVE RESULTS VERIFIED, PHONED TO AND READ BACK BY YRN KEYS 3RD [facility] AT [place] [id number] ON 10/27/18 [facility] Culture result: CULTURE IN PROGRESS,FURTHER UPDATES TO FOLLOW No results found for this visit on 10/26/18. Current Meds: 
Current Facility-Administered Medications Medication Dose Route Frequency  influenza vaccine 2018-19 (6 mos+)(PF) (FLUARIX QUAD/FLULAVAL QUAD) injection 0.5 mL  0.5 mL IntraMUSCular PRIOR TO DISCHARGE  hydrALAZINE (APRESOLINE) 20 mg/mL injection 20 mg  20 mg IntraVENous Q4H PRN  
 heparin (porcine) injection 5,000 Units  5,000 Units SubCUTAneous Q8H  
 amLODIPine (NORVASC) tablet 10 mg  10 mg Oral DAILY  aspirin chewable tablet 81 mg  81 mg Oral DAILY  fenofibrate (LOFIBRA) tablet 160 mg  160 mg Oral 7am  
 levothyroxine (SYNTHROID) tablet 75 mcg  75 mcg Oral ACB  pantoprazole (PROTONIX) tablet 40 mg  40 mg Oral ACB  insulin lispro (HUMALOG) injection   SubCUTAneous AC&HS  sodium chloride (NS) flush 5-10 mL  5-10 mL IntraVENous Q8H  
 sodium chloride (NS) flush 5-10 mL  5-10 mL IntraVENous PRN  
 acetaminophen (TYLENOL) tablet 650 mg  650 mg Oral Q4H PRN  
 ondansetron (ZOFRAN) injection 4 mg  4 mg IntraVENous Q4H PRN  
 0.9% sodium chloride infusion  125 mL/hr IntraVENous CONTINUOUS  
 insulin NPH (NOVOLIN N, HUMULIN N) injection 35 Units  35 Units SubCUTAneous ACB&D  cefTRIAXone (ROCEPHIN) 2 g in 0.9% sodium chloride (MBP/ADV) 50 mL  2 g IntraVENous Q24H  calcium carbonate (TUMS) chewable tablet 200 mg [elemental]  200 mg Oral TID PRN  
 valsartan (DIOVAN) tablet 320 mg  320 mg Oral QHS  vancomycin (VANCOCIN) 2000 mg in  ml infusion  2,000 mg IntraVENous Q12H Other Studies (last 24 hours): 
Mri Foot Rt W Wo Cont Result Date: 10/27/2018 MRI right foot with and without contrast 10/27/2018 COMPARISON: Plain films right foot 10/26/2018 INDICATION: Diabetic patient with plantar ulceration, evaluate for myelitis.  TECHNIQUE: Multiplanar multisequence MR imaging of the right foot prior to and following the uncomplicated administration of 30 mL Dotarem. FINDINGS: Fragmentation at the mid foot with scattered mottled appearance bone marrow consistent with Charcot foot as well as likely underlying chronic osteomyelitis sequelae. There is soft tissue ulceration with inflammation and edema plantar surface adjacent to the base of the fifth metatarsal head. This process extends to involve the bone itself with decreased T1 signal in the marrow and elevated T2 signal consistent with acute osteomyelitis. Adjacent fluid collection 2.9 x 1.6 cm with peripheral enhancement consistent with abscess. IMPRESSION: 1. Fifth metatarsal acute osteomyelitis with adjacent small volume abscess and extensive soft tissue edema. 2. Associated adjacent myositis as well. Assessment and Plan:  
 
Hospital Problems as of 10/28/2018 Date Reviewed: 10/27/2018 Codes Class Noted - Resolved POA Acute on chronic renal failure (HCC) ICD-10-CM: N17.9, N18.9 ICD-9-CM: 584.9, 585.9  10/27/2018 - Present Yes Charcot foot due to diabetes mellitus (HCC) (Chronic) ICD-10-CM: E11.610 ICD-9-CM: 250.60, 713.5  10/26/2018 - Present Yes * (Principal) Osteomyelitis of right foot ([facility] [address] ICD-10-CM: M86.9 ICD-9-CM: 730.27  7/22/2015 - Present Yes Sepsis ([facility] [address] ICD-10-CM: A41.9 ICD-9-CM: 038.9, 995.91  7/22/2015 - Present Yes DM type 2 (diabetes mellitus, type 2) (HCC) (Chronic) ICD-10-CM: E11.9 ICD-9-CM: 250.00  7/22/2015 - Present Yes RESOLVED: Acute kidney injury ([facility] [address] ICD-10-CM: N17.9 ICD-9-CM: 584.9  10/26/2018 - 10/27/2018 Plan: 
Sepsis due to DM R foot infection with possible osteo -  
· Cont vanc/rocephin for now. · Ortho consulted. May need surgery for abscess and possibly removal of infected bone · Initial blood cx 10/26 with GPCs. Repeat blood cultures today · ID consult tomorrow · Wound care consulted AoCKD · Cont IVF. improving DM2 · Cont home NPH/reg 70/30.  40-15 units BID. ISS HTN 
· Monitor. Cont home meds. May need tighter control DC planning/Dispo:  PPD ordered. Diet:  DIET DIABETIC CONSISTENT CARB 
DVT ppx:  heparin Signed: 
Troy Torres MD

## 2018-10-28 NOTE — PROGRESS NOTES
Lab Results Component Value Date/Time Vancomycin,trough 16.0 10/28/2018 07:02 AM  
 
This is day 2 of Vancomycin for diabetic foot. Goal trough is 15-20. We will continue the the dose of 2g q12h. We will continue to follow the patient and adjust the dose as necessary per guidelines. Thank you, Marlene Reyes, OlegD

## 2018-10-28 NOTE — PROGRESS NOTES
Patient ambulated to shower using walker with standby assist. Showered self with CHG wash, little assistance needed. Moderate amount of serosanguinous drainage draining from foot wound. Redressed foot with abd pad, kerlix, and ace wrap. Patient c/o no pain. Temp 99.5.  Provided patient with IS

## 2018-10-28 NOTE — PROGRESS NOTES
Patient resting quietly watching tv, alert and oriented, no distress noted. Right foot dressing soiled with serosangenous drainage, changed, voiding clear yellow urine. Neurovascular and peripheral vascular checks WNL. Bed low and locked position. Call light within reach. Patient instructed to call for assistance, verbalizes understanding. Nursing assessment complete.

## 2018-10-29 LAB
ANION GAP SERPL CALC-SCNC: 12 MMOL/L
BASOPHILS # BLD: 0.1 K/UL (ref 0–0.2)
BASOPHILS NFR BLD: 1 % (ref 0–2)
BUN SERPL-MCNC: 19 MG/DL (ref 6–23)
CALCIUM SERPL-MCNC: 9 MG/DL (ref 8.3–10.4)
CHLORIDE SERPL-SCNC: 107 MMOL/L (ref 98–107)
CO2 SERPL-SCNC: 22 MMOL/L (ref 21–32)
CREAT SERPL-MCNC: 1.49 MG/DL (ref 0.8–1.5)
DIFFERENTIAL METHOD BLD: ABNORMAL
EOSINOPHIL # BLD: 0.1 K/UL (ref 0–0.8)
EOSINOPHIL NFR BLD: 1 % (ref 0.5–7.8)
ERYTHROCYTE [DISTWIDTH] IN BLOOD BY AUTOMATED COUNT: 15.1 %
GLUCOSE BLD STRIP.AUTO-MCNC: 139 MG/DL (ref 65–100)
GLUCOSE BLD STRIP.AUTO-MCNC: 161 MG/DL (ref 65–100)
GLUCOSE BLD STRIP.AUTO-MCNC: 192 MG/DL (ref 65–100)
GLUCOSE BLD STRIP.AUTO-MCNC: 99 MG/DL (ref 65–100)
GLUCOSE SERPL-MCNC: 181 MG/DL (ref 65–100)
HCT VFR BLD AUTO: 32.9 % (ref 41.1–50.3)
HGB BLD-MCNC: 10.4 G/DL (ref 13.6–17.2)
IMM GRANULOCYTES # BLD: 0.1 K/UL (ref 0–0.5)
IMM GRANULOCYTES NFR BLD AUTO: 1 % (ref 0–5)
LYMPHOCYTES # BLD: 1.2 K/UL (ref 0.5–4.6)
LYMPHOCYTES NFR BLD: 14 % (ref 13–44)
MCH RBC QN AUTO: 26.9 PG (ref 26.1–32.9)
MCHC RBC AUTO-ENTMCNC: 31.6 G/DL (ref 31.4–35)
MCV RBC AUTO: 85.2 FL (ref 79.6–97.8)
MONOCYTES # BLD: 0.5 K/UL (ref 0.1–1.3)
MONOCYTES NFR BLD: 6 % (ref 4–12)
NEUTS SEG # BLD: 6.7 K/UL (ref 1.7–8.2)
NEUTS SEG NFR BLD: 78 % (ref 43–78)
NRBC # BLD: 0 K/UL (ref 0–0.2)
PLATELET # BLD AUTO: 342 K/UL (ref 150–450)
PMV BLD AUTO: 9.3 FL (ref 9.4–12.3)
POTASSIUM SERPL-SCNC: 4 MMOL/L (ref 3.5–5.1)
RBC # BLD AUTO: 3.86 M/UL (ref 4.23–5.6)
SODIUM SERPL-SCNC: 141 MMOL/L (ref 136–145)
WBC # BLD AUTO: 8.6 K/UL (ref 4.3–11.1)

## 2018-10-29 PROCEDURE — 74011250636 HC RX REV CODE- 250/636: Performed by: INTERNAL MEDICINE

## 2018-10-29 PROCEDURE — 77030020263 HC SOL INJ SOD CL0.9% LFCR 1000ML

## 2018-10-29 PROCEDURE — 87070 CULTURE OTHR SPECIMN AEROBIC: CPT

## 2018-10-29 PROCEDURE — 87075 CULTR BACTERIA EXCEPT BLOOD: CPT

## 2018-10-29 PROCEDURE — 85025 COMPLETE CBC W/AUTO DIFF WBC: CPT

## 2018-10-29 PROCEDURE — 74011250637 HC RX REV CODE- 250/637: Performed by: INTERNAL MEDICINE

## 2018-10-29 PROCEDURE — 74011000258 HC RX REV CODE- 258: Performed by: INTERNAL MEDICINE

## 2018-10-29 PROCEDURE — 74011000250 HC RX REV CODE- 250: Performed by: INTERNAL MEDICINE

## 2018-10-29 PROCEDURE — 82962 GLUCOSE BLOOD TEST: CPT

## 2018-10-29 PROCEDURE — 74011636637 HC RX REV CODE- 636/637: Performed by: INTERNAL MEDICINE

## 2018-10-29 PROCEDURE — 65270000029 HC RM PRIVATE

## 2018-10-29 PROCEDURE — 80048 BASIC METABOLIC PNL TOTAL CA: CPT

## 2018-10-29 PROCEDURE — C8929 TTE W OR WO FOL WCON,DOPPLER: HCPCS

## 2018-10-29 PROCEDURE — 36415 COLL VENOUS BLD VENIPUNCTURE: CPT

## 2018-10-29 RX ORDER — METRONIDAZOLE 500 MG/1
500 TABLET ORAL EVERY 12 HOURS
Status: DISCONTINUED | OUTPATIENT
Start: 2018-10-29 | End: 2018-11-02 | Stop reason: HOSPADM

## 2018-10-29 RX ORDER — INSULIN LISPRO 100 [IU]/ML
20 INJECTION, SOLUTION INTRAVENOUS; SUBCUTANEOUS
Status: DISCONTINUED | OUTPATIENT
Start: 2018-10-29 | End: 2018-10-29

## 2018-10-29 RX ORDER — INSULIN GLARGINE 100 [IU]/ML
45 INJECTION, SOLUTION SUBCUTANEOUS
Status: DISCONTINUED | OUTPATIENT
Start: 2018-10-29 | End: 2018-11-02 | Stop reason: HOSPADM

## 2018-10-29 RX ORDER — INSULIN LISPRO 100 [IU]/ML
25 INJECTION, SOLUTION INTRAVENOUS; SUBCUTANEOUS
Status: DISCONTINUED | OUTPATIENT
Start: 2018-10-29 | End: 2018-11-02 | Stop reason: HOSPADM

## 2018-10-29 RX ADMIN — INSULIN LISPRO 2 UNITS: 100 INJECTION, SOLUTION INTRAVENOUS; SUBCUTANEOUS at 22:27

## 2018-10-29 RX ADMIN — METRONIDAZOLE 500 MG: 500 TABLET ORAL at 21:03

## 2018-10-29 RX ADMIN — INSULIN GLARGINE 45 UNITS: 100 INJECTION, SOLUTION SUBCUTANEOUS at 22:59

## 2018-10-29 RX ADMIN — PERFLUTREN 1 ML: 6.52 INJECTION, SUSPENSION INTRAVENOUS at 14:00

## 2018-10-29 RX ADMIN — INSULIN LISPRO 25 UNITS: 100 INJECTION, SOLUTION INTRAVENOUS; SUBCUTANEOUS at 17:19

## 2018-10-29 RX ADMIN — Medication 10 ML: at 05:45

## 2018-10-29 RX ADMIN — INSULIN GLARGINE 50 UNITS: 100 INJECTION, SOLUTION SUBCUTANEOUS at 08:22

## 2018-10-29 RX ADMIN — HEPARIN SODIUM 5000 UNITS: 5000 INJECTION INTRAVENOUS; SUBCUTANEOUS at 08:23

## 2018-10-29 RX ADMIN — SODIUM CHLORIDE 125 ML/HR: 900 INJECTION, SOLUTION INTRAVENOUS at 20:35

## 2018-10-29 RX ADMIN — METRONIDAZOLE 500 MG: 500 TABLET ORAL at 10:07

## 2018-10-29 RX ADMIN — VALSARTAN 320 MG: 320 TABLET, FILM COATED ORAL at 21:03

## 2018-10-29 RX ADMIN — VANCOMYCIN HYDROCHLORIDE 2000 MG: 10 INJECTION, POWDER, LYOPHILIZED, FOR SOLUTION INTRAVENOUS at 20:35

## 2018-10-29 RX ADMIN — CEFTRIAXONE SODIUM 2 G: 2 INJECTION, POWDER, FOR SOLUTION INTRAMUSCULAR; INTRAVENOUS at 19:59

## 2018-10-29 RX ADMIN — INSULIN LISPRO 20 UNITS: 100 INJECTION, SOLUTION INTRAVENOUS; SUBCUTANEOUS at 11:30

## 2018-10-29 RX ADMIN — HEPARIN SODIUM 5000 UNITS: 5000 INJECTION INTRAVENOUS; SUBCUTANEOUS at 17:20

## 2018-10-29 RX ADMIN — FENOFIBRATE 160 MG: 160 TABLET ORAL at 08:22

## 2018-10-29 RX ADMIN — VANCOMYCIN HYDROCHLORIDE 2000 MG: 10 INJECTION, POWDER, LYOPHILIZED, FOR SOLUTION INTRAVENOUS at 08:23

## 2018-10-29 RX ADMIN — LEVOTHYROXINE SODIUM 75 MCG: 75 TABLET ORAL at 05:41

## 2018-10-29 RX ADMIN — ASPIRIN 81 MG CHEWABLE TABLET 81 MG: 81 TABLET CHEWABLE at 08:22

## 2018-10-29 RX ADMIN — AMLODIPINE BESYLATE 10 MG: 10 TABLET ORAL at 08:22

## 2018-10-29 RX ADMIN — PANTOPRAZOLE SODIUM 40 MG: 40 TABLET, DELAYED RELEASE ORAL at 05:41

## 2018-10-29 RX ADMIN — INSULIN LISPRO 2 UNITS: 100 INJECTION, SOLUTION INTRAVENOUS; SUBCUTANEOUS at 11:30
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
## 2018-10-29 NOTE — PROGRESS NOTES
He was seen in consultation for possible foot abscess surgery HPI:   
11- DX:   Right Charcot neuroarthropathic dorsal midfoot translation with plantar rocker and ankle collapse Right lateral foot soft tissue mass - ?? related to subluxed calcaneocuboid bone Left planovalgus foot - talonavicular Ca++ and sagging, but no acute Charcot - ?? developing ?? 
 IDDM with neuropathy Significant venous stasis disease 3/2/2016- Left 5th toe and ray amputation 7/22/2015- Left 4th toe amputation with metatarsal ray resection May 2018he developed a callus that became an ulcer; Dr. Nirali Ricardo has been treating 8-7-2018 seen by wound care to discuss the non-healing wound 10-2-2018: We discussed that he was a candidate for a BKA but recommended he see dr. Gael Gaitan Patient reports Dr. Gael Gaitan had no surgical solution He was admitted to [facility] 10- for suspected sepsis Patient reports fever resolved Dr. Quincy Martinez recommended surgical; consult Location of pain -  Right plantar foot Type/severity pain -  dull burning Aggravating factors -  standing, walking Alleviating factors -  rest 
 
PMSFH:  Included on the patient history form ; reviewed and updated MEDS:        Amlodipine-Valsartan-HCTZ (-. .. MG); Aspirin; Bactrim; Fenofibrate (160 MG); NovoLOG Mix 70/30 ((70-30). .. UNIT/ML); NovoLOG (100 UNI. .. UNIT/ML); Synthroid (50 MCG); Vitamins/Minerals - since hopsital - Flagyl, IV Rocephin and Vancomycin ALLERGIES:  NKDA 
PMH:  IDDM, Neuropathy, HTN, Chol./TG, Thyroid, HTN, GERD 
SOCIAL: Works  [employer] : single: non smoker ROS:  Per POA form: positive and negative system reviews were discussed. I tried to relate any positive system review to the musculoskeletal complaint. For all others, recommend the PCP or any specialists Posture:GaitPain/Palpation:Swelling/EffusionROM Strength Instability/Special Test 
Crepitance PE:  He is alert and oriented. Nutrition, appearance and mood all okay. RESPIRATIONS:  Unlabored with no obvious shortness of breath. CV:  Appears to have pedal pulses, color, capillary refill, subungual color. Varicosities NEURO:  No abnormal reflexes or clonus. Sensation diminished to light touch and sharp/dull discrimination. SLR negative. No popliteal tenderness SKIN:  Thick nails; plantarlateral foot soft-tissue thickening; superficial 8 cm ulcer; no redness, but a large 20 cm hypertrophic skin MS:  Rocker deformity; claw toes; Gait = no real pain Right plantar 8 cm ulceration, no infection, exuberant plantar foot thickening Soft tissue thickening; no crepitance; limited ankle, subtalar, tarsal motion; has strength; no instability XR:  Right side  NWB AP, lateral, oblique foot taken 8-7-2018 w/ midfoot Charcot neuroarthropathic collapse; large lateral foot soft tissue density XR Impression:   As above MRI Right foot 6/14/2018 Large lobuated soft tissue mass of heterogeneous T1 + T2 signal with foci of absent signal within the mass suggests a large pressure lesion due to the chronic midfoot and hindfoot collapse with associated deep gas within the suspected fibrogranulomatous tissue related to the adjacent superficial ulcer. This correlates with plain radiographic findings. No discrete drainable collection. I suspect this is phelegmonous/ fibrogranulomatous tissue. This is no underlying osteomyelitis nor evidence of septic arthritis. Given the patients low GFR, contrasted images are not indicated in this regard MRI Right foot 10- read as 5th metatarsal osteomyelitis, suspected abscess Procedure done today:  
Procedure:  Time out verification done. After sterile prep, the central ulcer was opened and the abscess was opened, drained and packed. Deep cultures taken. No gross infection. Tolerated well. DX:   Right midfoot Charcot collapse, plantarlateral foot 20 cm hypertrophic tissue, 8 cm superficial ulcer, abscess vs. Bursal fluid IDDM  neuropathy The patient and I discussed the above diagnoses and explored different options. Discussed that the exuberant 20 hypertrophic skin is severe and Dr. Sarah Beth Nava and I have discussed that he is a high BKA risk Continue neuropathic foot care - he reports that he is planning on NWB and working from home I recommend he resume his formal wound care He has an Taylor 1153 brace, UCBL/TCI's with PQ stress relief - he has also discussed with Ritesh Foster at [address] [place] [address]  felt the fit would be very difficult and reccommended bone resection if possible Discussed surgical options for radical debridement vs. amputation Surgery-  Discussed recommend IV antibiotics then as an out-patient possible Left     
radical tissue and bone resection   
 op or am admit  - anesthesia choice - 1 hour   
 sag. Rajwinder mcneill Blazing for antibiotic pellets The patient understands the diagnoses w/ conservative and surgical treatment. Surgery, the risks and complications, and the expected postoperative course are all outlined. I went over debridement with radical tissue and bone resection. The patient understands general and specific surgical general risks and complications including repeat surgical treatment. Understands the risk of continued skin and bone infection, internal or possible external fixation and understands the significant problems that can be encountered w/ osteomyelitic deformities. Accepts that amputation might be the end result if the infection cannot be contained Work - sitting work

## 2018-10-29 NOTE — PROGRESS NOTES
Distal occlusion alarm cleared x 8 in 1 hour. Patient sitting on side of bed watching iv pump infuse and bending wrist.  RN explained to patient importance of not bending wrist as this action occludes the iv line. Patient verbalized understanding. Will monitor.

## 2018-10-29 NOTE — PROGRESS NOTES
Vancomycin Consult (Day 4) MD ordering: Diana BRANNON following? yes Indication: DFI/Osteomyelitis DOT:  ? days Goal level(s): 15 - 20 Ht Readings from Last 1 Encounters:  
10/26/18 6' (1.829 m) Wt Readings from Last 1 Encounters:  
10/26/18 (!) 172.4 kg (380 lb) Temp (24hrs), Av °F (37.2 °C), Min:98 °F (36.7 °C), Max:99.8 °F (37.7 °C) Dosing weight: 172 kg 
62 y.o. Date:  Dose/Freq Admin Times Level/Time:  
10/26 2500 mg x 1 dose 1915   
10/27 2 g IV q12h 901, 1939   
10/28 2 g IV q12h 801,  Tr @0702 = 16  
10/29 2 g IV q12h 0823, (due at ) 10/30 2 g IV q12h  Tr ordered for 0700 Recent Labs 10/29/18 
1036 10/27/18 
0438 10/26/18 
1746 BUN 19 26* 28* CREA 1.49 1.97* 2.19* WBC 8.6 13.6* 11.2* Estimated Creatinine Clearance: 88.3 mL/min (based on SCr of 1.49 mg/dL). A/P:   
Continue current dose of Vancomycin 2 gm IV every 12 hours. Trough level scheduled for tomorrow (10/30) at 0700, prior to 0800 dose. Will continue to follow. Thank you, Kiera Nicholas, PharmD

## 2018-10-29 NOTE — PROGRESS NOTES
Shift assessment complete, see flow sheet for full assessment. Pt alert and oriented x4. Respirations even and unlabored breath sounds clear bilaterally. S1 S2 auscultated, VSS. Skin warm, dry. RLE diabetic ulcer with serous sanguinous draingage. 4x4, abd pad and ace wrap to foot. +4 pitting edema BLE. Abdomen soft and non tender, bowel sounds active in all quadrants. Bed low and locked, call light within reach, pt advised to call if assistance is needed.

## 2018-10-29 NOTE — WOUND CARE
Right foot with known Charcot deformity and neuropathy uses offloading boot/ shoe. See Dr. Reji Contreras outpatient at [facility] wound clinic chronic since May 2018. Today was treated by Dr. Kristina Sepulveda with bedside debridement. Mild erythema surrounding wounds, callous on plantar surface and edema, drainage is serousanganous and with out odor. Concern for deep infection/ abcess including osteomyelitis. Concern for this foot with possible need for amputation is high. Noted Dr. Scarlett Palomino notes and recommendations. Recommend packing th open areas with Iodoform gauze, dry dressing and ace daily with a return to see Dr. Reji Contreras, patient has appointment in approximately 3 weeks, should keep this appointment. Will need assistance with packing at home, however feels he will be able to do this himself. Will need supplies sent home, home health to assist.  Will monitor.

## 2018-10-29 NOTE — PROGRESS NOTES
Hospitalist Progress Note Admit Date:  10/26/2018  5:05 PM  
Name:  Yvette Whitaker Age:  62 y.o. 
:  1960 MRN:  [id number] PCP:  Mandeep Sosa MD 
Treatment Team: Attending Provider: Juanita Alejo DO; Consulting Provider: Mary Louie MD; Utilization Review: Antonietta Jensen; Care Manager: Carmela Michel; Consulting Provider: Wanda Farr MD 
 
Subjective:  
Patient pleasant 58M with pmhx of DM, HTN, [place], sleep apnea, hypothyroidism presented with less than one day of subjective fever, chills, and increased right foot swelling and redness. Says he felt in his normal state of health until when he checked fever at home of 100. Patient with hx of Charcot foot and chronic ulceration, follows with outpatient Wound care, Dr Tiana Pace and Dr Liliana Cortez. Has been on bactrim for several months. XR  With significant bone destruction of midfoot and hindfoot. Was given vanco/Rocephin and cultured. Hospitalist asked to admit for sepsis, diabetic foot ulceration/suspected osteomyelelitis. MRI confirmed osteomyelitis and abscess. Ortho and ID consulted. flagyl added. Blood cx grew alpha strep. 10/29 - feels well. No complaints. Is ambulating some with walker. Denies foot pain. No fevers. Cellulitis improved Objective:  
 
Patient Vitals for the past 24 hrs: 
 Temp Pulse Resp BP SpO2  
10/29/18 0701 98 °F (36.7 °C) 82 18 138/83 96 % 10/29/18 0429 98.6 °F (37 °C) 82 16 128/72 95 % 10/28/18 2333 99.4 °F (37.4 °C) 79 16 128/72 95 % 10/28/18 2017 99.8 °F (37.7 °C) 85 18 117/66 94 % 10/28/18 1541 99.5 °F (37.5 °C) 84 18 144/82 96 % 10/28/18 1215 98.2 °F (36.8 °C) 80 18 161/80 95 % Oxygen Therapy O2 Sat (%): 96 % (10/29/18 0701) Pulse via Oximetry: 108 beats per minute (10/26/18 1906) O2 Device: Room air (10/28/18 1915) Intake/Output Summary (Last 24 hours) at 10/29/2018 9784 Last data filed at 10/29/2018 3106 Gross per 24 hour Intake 3001 ml Output 2900 ml Net 101 ml General:    Well nourished. Alert. Extremities: Warm and dry. No cyanosis. Chronic stasis edema massive with derm changes. R foot bandaged, c/d/i. Skin:     No rashes or jaundice. Neuro:  No gross focal deficits Data Review: 
I have reviewed all labs, meds, telemetry events, and studies from the last 24 hours: 
 
Recent Results (from the past 24 hour(s)) GLUCOSE, POC Collection Time: 10/28/18 12:16 PM  
Result Value Ref Range Glucose (POC) 214 (H) 65 - 100 mg/dL GLUCOSE, POC Collection Time: 10/28/18  4:43 PM  
Result Value Ref Range Glucose (POC) 130 (H) 65 - 100 mg/dL GLUCOSE, POC Collection Time: 10/28/18  9:46 PM  
Result Value Ref Range Glucose (POC) 123 (H) 65 - 100 mg/dL GLUCOSE, POC Collection Time: 10/29/18  5:40 AM  
Result Value Ref Range Glucose (POC) 99 65 - 100 mg/dL All Micro Results Procedure Component Value Units Date/Time CULTURE, BLOOD [[id number]]  (Abnormal) Collected:  10/26/18 1746 Order Status:  Completed Specimen:  Blood Updated:  10/29/18 2297 Special Requests: RIGHT FOREARM     
  GRAM STAIN GRAM POSITIVE COCCI ANAEROBIC BOTTLE POSITIVE RESULTS VERIFIED, PHONED TO AND READ BACK BY YRN KEYS 3RD [facility] AT [place] [address] ON 10/27/18 [facility] Culture result: ALPHA STREPTOCOCCUS     
   REFER TO ACC NO [id number] FOR ID AND SUSCEPTIBILITY CULTURE, BLOOD [[id number]]  (Abnormal) Collected:  10/26/18 1746 Order Status:  Completed Specimen:  Blood Updated:  10/29/18 2680 Special Requests: RIGHT ANTECUBITAL     
  GRAM STAIN GRAM POSITIVE COCCI ANAEROBIC BOTTLE POSITIVE RESULTS VERIFIED, PHONED TO AND READ BACK BY  YRN KEYS 3RD FLOOR AT [place] [address] ON 10/27/18 [facility] Culture result: ALPHA STREPTOCOCCUS IDENTIFICATION AND SUSCEPTIBILITY TO FOLLOW CULTURE, BLOOD [[id number]] Collected:  10/28/18 1047 Order Status:  Completed Specimen:  Blood Updated:  10/28/18 1058 CULTURE, BLOOD [[id number]] Collected:  10/28/18 1037 Order Status:  Completed Specimen:  Blood Updated:  10/28/18 1058 No results found for this visit on 10/26/18. Current Meds: 
Current Facility-Administered Medications Medication Dose Route Frequency  metroNIDAZOLE (FLAGYL) tablet 500 mg  500 mg Oral Q12H  
 insulin glargine (LANTUS) injection 45 Units  45 Units SubCUTAneous QHS  insulin lispro (HUMALOG) injection 30 Units  30 Units SubCUTAneous TID WITH MEALS  influenza vaccine 2018-19 (6 mos+)(PF) (FLUARIX QUAD/FLULAVAL QUAD) injection 0.5 mL  0.5 mL IntraMUSCular PRIOR TO DISCHARGE  hydrALAZINE (APRESOLINE) 20 mg/mL injection 20 mg  20 mg IntraVENous Q4H PRN  
 heparin (porcine) injection 5,000 Units  5,000 Units SubCUTAneous Q8H  
 amLODIPine (NORVASC) tablet 10 mg  10 mg Oral DAILY  aspirin chewable tablet 81 mg  81 mg Oral DAILY  fenofibrate (LOFIBRA) tablet 160 mg  160 mg Oral 7am  
 levothyroxine (SYNTHROID) tablet 75 mcg  75 mcg Oral ACB  pantoprazole (PROTONIX) tablet 40 mg  40 mg Oral ACB  insulin lispro (HUMALOG) injection   SubCUTAneous AC&HS  sodium chloride (NS) flush 5-10 mL  5-10 mL IntraVENous Q8H  
 sodium chloride (NS) flush 5-10 mL  5-10 mL IntraVENous PRN  
 acetaminophen (TYLENOL) tablet 650 mg  650 mg Oral Q4H PRN  
 ondansetron (ZOFRAN) injection 4 mg  4 mg IntraVENous Q4H PRN  
 0.9% sodium chloride infusion  125 mL/hr IntraVENous CONTINUOUS  
 cefTRIAXone (ROCEPHIN) 2 g in 0.9% sodium chloride (MBP/ADV) 50 mL  2 g IntraVENous Q24H  calcium carbonate (TUMS) chewable tablet 200 mg [elemental]  200 mg Oral TID PRN  
 valsartan (DIOVAN) tablet 320 mg  320 mg Oral QHS  vancomycin (VANCOCIN) 2000 mg in  ml infusion  2,000 mg IntraVENous Q12H Other Studies (last 24 hours): No results found. Assessment and Plan:  
 
Hospital Problems as of 10/29/2018 Date Reviewed: 10/27/2018 Codes Class Noted - Resolved POA Acute on chronic renal failure (HCC) ICD-10-CM: N17.9, N18.9 ICD-9-CM: 584.9, 585.9  10/27/2018 - Present Yes Charcot foot due to diabetes mellitus (HCC) (Chronic) ICD-10-CM: E11.610 ICD-9-CM: 250.60, 713.5  10/26/2018 - Present Yes * (Principal) Osteomyelitis of right foot ([facility] ICD-10-CM: M86.9 ICD-9-CM: 730.27  7/22/2015 - Present Yes Sepsis ([facility] ICD-10-CM: A41.9 ICD-9-CM: 038.9, 995.91  7/22/2015 - Present Yes DM type 2 (diabetes mellitus, type 2) ([facility]) (Chronic) ICD-10-CM: E11.9 ICD-9-CM: 250.00  7/22/2015 - Present Yes RESOLVED: Acute kidney injury ([facility] ICD-10-CM: N17.9 ICD-9-CM: 584.9  10/26/2018 - 10/27/2018 Plan: 
Sepsis due to DM R foot infection with possible osteo -  
· ID consulted; appreciated · Cont vanc/rocephin/flagyl for now or as per ID 
· Ortho consulted. Needs surgery for abscess and possibly removal of infected bone · Initial blood cx 10/26 with alpha strep. Repeat blood cultures 10/28 pending · Wound care consulted AoCKD · Cont IVF. Improving. Daily BMP, pending today DM2 
· Pharmacy substituted lantus and AC lispro for home NPH/reg 70/30. · Will reduce lantus by 5u today. Reduce prandial by 5u also. HTN 
· Stable. monitor. Cont home meds. DC planning/Dispo:  PPD ordered. Diet:  DIET DIABETIC CONSISTENT CARB 
DVT ppx:  heparin Signed: 
Marianela Castelan MD

## 2018-10-30 PROBLEM — R78.81 BACTEREMIA DUE TO STREPTOCOCCUS: Status: ACTIVE | Noted: 2018-10-30

## 2018-10-30 PROBLEM — B95.5 BACTEREMIA DUE TO STREPTOCOCCUS: Status: ACTIVE | Noted: 2018-10-30

## 2018-10-30 LAB
ANION GAP SERPL CALC-SCNC: 10 MMOL/L
BACTERIA SPEC CULT: ABNORMAL
BUN SERPL-MCNC: 19 MG/DL (ref 6–23)
CALCIUM SERPL-MCNC: 8.5 MG/DL (ref 8.3–10.4)
CHLORIDE SERPL-SCNC: 108 MMOL/L (ref 98–107)
CO2 SERPL-SCNC: 23 MMOL/L (ref 21–32)
CREAT SERPL-MCNC: 1.3 MG/DL (ref 0.8–1.5)
GLUCOSE BLD STRIP.AUTO-MCNC: 135 MG/DL (ref 65–100)
GLUCOSE BLD STRIP.AUTO-MCNC: 149 MG/DL (ref 65–100)
GLUCOSE BLD STRIP.AUTO-MCNC: 151 MG/DL (ref 65–100)
GLUCOSE SERPL-MCNC: 131 MG/DL (ref 65–100)
GRAM STN SPEC: ABNORMAL
POTASSIUM SERPL-SCNC: 3.8 MMOL/L (ref 3.5–5.1)
SERVICE CMNT-IMP: ABNORMAL
SERVICE CMNT-IMP: ABNORMAL
SODIUM SERPL-SCNC: 141 MMOL/L (ref 136–145)
VANCOMYCIN TROUGH SERPL-MCNC: 18.3 UG/ML (ref 5–20)

## 2018-10-30 PROCEDURE — 80048 BASIC METABOLIC PNL TOTAL CA: CPT

## 2018-10-30 PROCEDURE — 74011250636 HC RX REV CODE- 250/636: Performed by: INTERNAL MEDICINE

## 2018-10-30 PROCEDURE — 74011000258 HC RX REV CODE- 258: Performed by: INTERNAL MEDICINE

## 2018-10-30 PROCEDURE — 80202 ASSAY OF VANCOMYCIN: CPT

## 2018-10-30 PROCEDURE — 74011636637 HC RX REV CODE- 636/637: Performed by: INTERNAL MEDICINE

## 2018-10-30 PROCEDURE — 74011250637 HC RX REV CODE- 250/637: Performed by: INTERNAL MEDICINE

## 2018-10-30 PROCEDURE — 36415 COLL VENOUS BLD VENIPUNCTURE: CPT

## 2018-10-30 PROCEDURE — 65270000029 HC RM PRIVATE

## 2018-10-30 PROCEDURE — 82962 GLUCOSE BLOOD TEST: CPT

## 2018-10-30 RX ADMIN — VANCOMYCIN HYDROCHLORIDE 2000 MG: 10 INJECTION, POWDER, LYOPHILIZED, FOR SOLUTION INTRAVENOUS at 07:57

## 2018-10-30 RX ADMIN — HEPARIN SODIUM 5000 UNITS: 5000 INJECTION INTRAVENOUS; SUBCUTANEOUS at 07:56

## 2018-10-30 RX ADMIN — INSULIN GLARGINE 45 UNITS: 100 INJECTION, SOLUTION SUBCUTANEOUS at 22:15

## 2018-10-30 RX ADMIN — FENOFIBRATE 160 MG: 160 TABLET ORAL at 07:58

## 2018-10-30 RX ADMIN — HEPARIN SODIUM 5000 UNITS: 5000 INJECTION INTRAVENOUS; SUBCUTANEOUS at 16:48

## 2018-10-30 RX ADMIN — METRONIDAZOLE 500 MG: 500 TABLET ORAL at 22:16

## 2018-10-30 RX ADMIN — HEPARIN SODIUM 5000 UNITS: 5000 INJECTION INTRAVENOUS; SUBCUTANEOUS at 00:06

## 2018-10-30 RX ADMIN — LEVOTHYROXINE SODIUM 75 MCG: 75 TABLET ORAL at 06:09

## 2018-10-30 RX ADMIN — VANCOMYCIN HYDROCHLORIDE 2000 MG: 10 INJECTION, POWDER, LYOPHILIZED, FOR SOLUTION INTRAVENOUS at 21:07

## 2018-10-30 RX ADMIN — VALSARTAN 320 MG: 320 TABLET, FILM COATED ORAL at 22:16

## 2018-10-30 RX ADMIN — METRONIDAZOLE 500 MG: 500 TABLET ORAL at 07:58

## 2018-10-30 RX ADMIN — AMLODIPINE BESYLATE 10 MG: 10 TABLET ORAL at 07:58

## 2018-10-30 RX ADMIN — Medication 10 ML: at 19:16

## 2018-10-30 RX ADMIN — INSULIN LISPRO 25 UNITS: 100 INJECTION, SOLUTION INTRAVENOUS; SUBCUTANEOUS at 07:57

## 2018-10-30 RX ADMIN — INSULIN LISPRO 25 UNITS: 100 INJECTION, SOLUTION INTRAVENOUS; SUBCUTANEOUS at 16:48

## 2018-10-30 RX ADMIN — INSULIN LISPRO 2 UNITS: 100 INJECTION, SOLUTION INTRAVENOUS; SUBCUTANEOUS at 12:17

## 2018-10-30 RX ADMIN — Medication 10 ML: at 00:07

## 2018-10-30 RX ADMIN — PANTOPRAZOLE SODIUM 40 MG: 40 TABLET, DELAYED RELEASE ORAL at 06:09

## 2018-10-30 RX ADMIN — INSULIN LISPRO 25 UNITS: 100 INJECTION, SOLUTION INTRAVENOUS; SUBCUTANEOUS at 12:17

## 2018-10-30 RX ADMIN — CEFTRIAXONE SODIUM 2 G: 2 INJECTION, POWDER, FOR SOLUTION INTRAMUSCULAR; INTRAVENOUS at 20:20

## 2018-10-30 RX ADMIN — ASPIRIN 81 MG CHEWABLE TABLET 81 MG: 81 TABLET CHEWABLE at 07:58

## 2018-10-30 NOTE — PROGRESS NOTES
Shift assessment complete, see flow sheet for full assessment. Pt alert and oriented x4, denies pain at this time. Respirations even and unlabored breath sounds clear bilaterally. S1 S2 auscultated, VSS. Skin warm, dry. Abdomen soft and non tender, bowel sounds active in all quadrants. Bed low and locked, call light within reach, pt advised to call if assistance is needed.

## 2018-10-30 NOTE — PROGRESS NOTES
Spiritual Care initial visit made by Kadi Hoff. Support given. Card left. ROSAURA Reyes Div / Bereavement Coordinator

## 2018-10-30 NOTE — PROGRESS NOTES
Changed dressing using sterile technique. Packed with iodogorm, 4x4, ABD pad, Kerilex and ace wrap. Pt tolerated well.

## 2018-10-30 NOTE — PROGRESS NOTES
Patient alert and oriented x 4. Patient verbalizes no pain at present time. Incentive spiromentry at bedside. No signs of distress noted. Bandage to right foot CDI. Bed low and locked. Call light within reach. Patient instructed to call for assistance. Patient verbalized understanding. Will monitor.

## 2018-10-30 NOTE — PROGRESS NOTES
Hospitalist Progress Note 10/30/2018 Admit Date: 10/26/2018  5:05 PM  
NAME: Yu Mims :  1960 MRN:  [id number] Attending: Ramses Palmer DO 
PCP:  Mali Gutiérrez MD 
 
SUBJECTIVE:  
Patient pleasant 58M with pmhx of DM, HTN, MO, sleep apnea, hypothyroidism presented with less than one day of subjective fever, chills, and increased right foot swelling and redness. Says he felt in his normal state of health until when he checked fever at home of 100.  Patient with hx of Charcot foot and chronic ulceration, follows with outpatient Wound care, Dr Venkat Mccrary and Dr Elise Dakins. Has been on bactrim for several months. XR  With significant bone destruction of midfoot and hindfoot. Was given vanco/Rocephin and cultured. Hospitalist asked to admit for sepsis, diabetic foot ulceration/suspected osteomyelelitis. MRI confirmed osteomyelitis and abscess. Ortho and ID consulted.  flagyl added. Blood cx grew alpha strep. 10/30- reports he is feeling better overall. States swelling of legs at baseline and swelling of R foot getting better. Had bedside I&D of R foot yesterday and no significant abscess found by Dr. Asad Donahue. Wound culture yesterday with NGTD. Repeat blood cx 10/28 with NGTD (previous blood cx with Strep anginosus). Review of Systems negative with exception of pertinent positives noted above PHYSICAL EXAM  
 
Visit Vitals /85 (BP 1 Location: Right arm, BP Patient Position: At rest) Pulse 85 Temp 98 °F (36.7 °C) Resp 18 Ht 6' (1.829 m) Wt (!) 172.4 kg (380 lb) SpO2 98% BMI 51.54 kg/m² Temp (24hrs), Av.1 °F (36.7 °C), Min:97.5 °F (36.4 °C), Max:98.5 °F (36.9 °C) Patient Vitals for the past 24 hrs: 
 Temp Pulse Resp BP SpO2  
10/30/18 1153 98 °F (36.7 °C) 85 18 150/85 98 % 10/30/18 0717 98 °F (36.7 °C) 84 18 (!) 142/92 97 % 10/30/18 0429 98.5 °F (36.9 °C) 75 18 129/69 95 % 10/30/18 0001 97.5 °F (36.4 °C)  18 121/66 95 % 10/29/18 1908 98.2 °F (36.8 °C) 79 17 164/86 96 % 10/29/18 1504 98.1 °F (36.7 °C) 79 18 151/80 97 % Oxygen Therapy O2 Sat (%): 98 % (10/30/18 1153) Pulse via Oximetry: 108 beats per minute (10/26/18 1906) O2 Device: Room air (10/29/18 1959) Intake/Output Summary (Last 24 hours) at 10/30/2018 1409 Last data filed at 10/30/2018 0820 Gross per 24 hour Intake 1729 ml Output 1000 ml Net 729 ml  
  
General: No acute distress, morbidly obese   
Lungs:  CTA Bilaterally. Heart:  Regular rate and rhythm,  No murmur, rub, or gallop Abdomen: Soft, Non distended, Non tender, Positive bowel sounds Extremities: No cyanosis, 2+ pitting edema of legs and feet, R foot wrapped Neurologic:  No focal deficits Recent Results (from the past 24 hour(s)) CULTURE, WOUND W GRAM STAIN Collection Time: 10/29/18  3:08 PM  
Result Value Ref Range Special Requests: RIGHT 
SWAB 
    
 GRAM STAIN NO DEFINITE ORGANISM SEEN    
 GRAM STAIN 0 TO 5 WBC'S SEEN PER OIF Culture result:     
  NO GROWTH AFTER SHORT PERIOD OF INCUBATION. FURTHER RESULTS TO FOLLOW AFTER OVERNIGHT INCUBATION. GLUCOSE, POC Collection Time: 10/29/18  4:59 PM  
Result Value Ref Range Glucose (POC) 139 (H) 65 - 100 mg/dL GLUCOSE, POC Collection Time: 10/29/18  8:17 PM  
Result Value Ref Range Glucose (POC) 161 (H) 65 - 100 mg/dL METABOLIC PANEL, BASIC Collection Time: 10/30/18  4:27 AM  
Result Value Ref Range Sodium 141 136 - 145 mmol/L Potassium 3.8 3.5 - 5.1 mmol/L Chloride 108 (H) 98 - 107 mmol/L  
 CO2 23 21 - 32 mmol/L Anion gap 10 mmol/L Glucose 131 (H) 65 - 100 mg/dL BUN 19 6 - 23 MG/DL Creatinine 1.30 0.8 - 1.5 MG/DL  
 GFR est AA >60 >60 ml/min/1.73m2 GFR est non-AA >60 ml/min/1.73m2 Calcium 8.5 8.3 - 10.4 MG/DL Mega Godinez Collection Time: 10/30/18  6:53 AM  
Result Value Ref Range Vancomycin,trough 18.3 5 - 20 ug/mL GLUCOSE, POC Collection Time: 10/30/18 11:46 AM  
Result Value Ref Range Glucose (POC) 151 (H) 65 - 100 mg/dL Imaging: MRI FOOT RT W WO CONT Final Result IMPRESSION:  
1. Fifth metatarsal acute osteomyelitis with adjacent small volume abscess and  
extensive soft tissue edema. 2. Associated adjacent myositis as well. XR FOOT RT MIN 3 V Final Result IMPRESSION: Significant bone destruction in the midfoot and hindfoot. ASSESSMENT Hospital Problems as of 10/30/2018 Date Reviewed: 10/27/2018 Codes Class Noted - Resolved POA Bacteremia due to Streptococcus ICD-10-CM: R78.81, B95.5 ICD-9-CM: 790.7, 041.00  10/30/2018 - Present Yes Acute on chronic renal failure (HCC) ICD-10-CM: N17.9, N18.9 ICD-9-CM: 584.9, 585.9  10/27/2018 - Present Yes Charcot foot due to diabetes mellitus (HCC) (Chronic) ICD-10-CM: E11.610 ICD-9-CM: 250.60, 713.5  10/26/2018 - Present Yes * (Principal) Osteomyelitis of right foot ([facility] ICD-10-CM: M86.9 ICD-9-CM: 730.27  7/22/2015 - Present Yes Sepsis ([facility] ICD-10-CM: A41.9 ICD-9-CM: 038.9, 995.91  7/22/2015 - Present Yes DM type 2 (diabetes mellitus, type 2) ([facility]) (Chronic) ICD-10-CM: E11.9 ICD-9-CM: 250.00  7/22/2015 - Present Yes RESOLVED: Acute kidney injury ([facility] ICD-10-CM: N17.9 ICD-9-CM: 584.9  10/26/2018 - 10/27/2018 Plan: 
· Sepsis due to R foot infection/osteomyelitis and Strep anginosus bacteremia · Rocephin, flagyl, vancomycin per ID. · Follow repeat blood cx and foot wound cx. · ID trying to arrange outpatient abx follow up with group in [place], where he is from. · AoCKD- resolved. Stop IVF. · T2DM- blood sugars controlled on current regimen. · HTN- BP controlled for most part. Stop IVF and continue present bp meds. DVT Prophylaxis: Heparin Signed By: Kemar Hwang MD   
 October 30, 2018

## 2018-10-30 NOTE — CONSULTS
Infectious Disease Consult    Today's Date: 10/30/2018   Admit Date: 10/26/2018    Impression:   · Anginosus group streptococcal bacteremia  · Right foot severe DFI with acute osteomyelitis of the 5th metatarsal; Dr. Wes Kolb debrided on 10/29/18 and did not encounter abscess  · Superficial cellulitis right foot; improved after a few days of antibiotics    He has overlapping infection syndromes. He only had a few days of symptoms, presumably from superficial cellulitis and bacteremia. Osteomyelitis has likely been present for longer. I just found out that he lives in [place], [place] and is returning to there when he is discharged. Unfortunately, I will be unable to follow him on IV antibiotics, which I would prefer to do. I have left a message with [facility] in [place], [place] and am waiting a return call. Plan:   · Continue ceftriaxone and metronidazole  · Continue vancomycin  · I would just like to keep him here until cultures are resulted because of his social situation. Anti-infectives:   · Ceftriaxone (10/26/18)  · Vancomycin (10/26/18)    Subjective:   No complaints. Dr. Wes Kolb debrided his foot at bedside yesterday and did not encounter abscess. No Known Allergies     Review of Systems:  A comprehensive review of systems was negative except for that written in the History of Present Illness. Objective:     Visit Vitals  BP (!) 142/92 (BP 1 Location: Left arm, BP Patient Position: At rest)   Pulse 84   Temp 98 °F (36.7 °C)   Resp 18   Ht 6' (1.829 m)   Wt (!) 172.4 kg (380 lb)   SpO2 97%   BMI 51.54 kg/m²     Temp (24hrs), Av.2 °F (36.8 °C), Min:97.5 °F (36.4 °C), Max:98.7 °F (37.1 °C)       Lines:  Peripheral IV:       Physical Exam:    General:  Morbidly obese   Eyes:  Sclera anicteric. Pupils equally round and reactive to light.    Mouth/Throat: Mucous membranes normal, oral pharynx clear   Neck: Supple   Lungs:   Clear to auscultation bilaterally, good effort   CV: Regular rate and rhythm,no murmur, click, rub or gallop   Abdomen:   Soft, non-tender. bowel sounds normal. non-distended   Extremities: Right charcot foot;  Cellulitis has resolved. Skin: No rash   Lymph nodes: Cervical and supraclavicular normal   Musculoskeletal: Bilateral chronic lower extremity edema   Lines/Devices:  Intact, no erythema, drainage or tenderness   Psych: Alert and oriented, normal mood affect given the setting       Data Review:     CBC:  Recent Labs     10/29/18  1036   WBC 8.6   GRANS 78   MONOS 6   EOS 1   ANEU 6.7   ABL 1.2   HGB 10.4*   HCT 32.9*          BMP:  Recent Labs     10/30/18  0427 10/29/18  1036   CREA 1.30 1.49   BUN 19 19    141   K 3.8 4.0   * 107   CO2 23 22   AGAP 10 12   * 181*       LFTS:  No results for input(s): TBILI, ALT, SGOT, AP, TP, ALB in the last 72 hours. Microbiology:     All Micro Results     Procedure Component Value Units Date/Time    CHUNYadiel [[id number]] Collected:  10/29/18 1508    Order Status:  Completed Specimen:  Wound from Foot Updated:  10/30/18 0757     Special Requests: --        RIGHT  SWAB       GRAM STAIN PENDING     Culture result:       NO GROWTH AFTER SHORT PERIOD OF INCUBATION. FURTHER RESULTS TO FOLLOW AFTER OVERNIGHT INCUBATION.           Boby Villarreal [[id number]] Collected:  10/29/18 1508    Order Status:  Completed Specimen:  Foot, Right Updated:  10/30/18 0719    CULTURE, BLOOD [[id number]] Collected:  10/28/18 1037    Order Status:  Completed Specimen:  Blood Updated:  10/30/18 0715     Special Requests: --        RIGHT  Antecubital       Culture result: NO GROWTH 2 DAYS       CULTURE, BLOOD [[id number]] Collected:  10/28/18 1047    Order Status:  Completed Specimen:  Blood Updated:  10/30/18 0715     Special Requests: --        LEFT  HAND       Culture result: NO GROWTH 2 DAYS       CULTURE, BLOOD [[id number]]  (Abnormal) Collected:  10/26/18 1746    Order Status:  Completed Specimen: Blood Updated:  10/30/18 0651     Special Requests: RIGHT FOREARM        GRAM STAIN GRAM POSITIVE COCCI         ANAEROBIC BOTTLE POSITIVE               RESULTS VERIFIED, PHONED TO AND READ BACK BY YRN KEYS 3RD FLOOR AT [place] [address] ON 10/27/18 KD           Culture result: ALPHA STREPTOCOCCUS         REFER TO [address] NO [id number] FOR ID AND SUSCEPTIBILITY    CULTURE, BLOOD [[id number]]  (Abnormal)  (Susceptibility) Collected:  10/26/18 2291    Order Status:  Completed Specimen:  Blood Updated:  10/30/18 0650     Special Requests: RIGHT ANTECUBITAL        GRAM STAIN GRAM POSITIVE COCCI         ANAEROBIC BOTTLE POSITIVE               RESULTS VERIFIED, PHONED TO AND READ BACK BY  YRN KEYS 3RD FLOOR AT [facility] 19 ON 10/27/18 KD           Culture result: STREPTOCOCCUS ANGINOSUS       CULTURE, WOUND Trenia Newport STAIN [[id number]]     Order Status:  Canceled Specimen:  Wound from Foot     CULTURE, ANAEROBIC [[id number]]     Order Status:  Canceled Specimen:  Foot, Right           Imaging:   TTE (10/30/18)  -  Left ventricle: Size was at the upper limits of normal. Systolic function was normal. Ejection fraction was estimated in the range of 55 % to 60 % to be greater than 55 %. There were no regional wall motion abnormalities. There   Was mild concentric hypertrophy. Left ventricular diastolic function parameters were normal.  -  Inferior vena cava, hepatic veins: The inferior vena cava was mildly dilated. The respirophasic change in diameter was more than 50%. -  Aortic valve: There was no evidence for vegetation. -  Mitral valve: There was no evidence for vegetation. -  Tricuspid valve: There was trivial regurgitation. There was no evidence for vegetation. MRI right foot (10/27/18)  IMPRESSION:   1. Fifth metatarsal acute osteomyelitis with adjacent small volume abscess and   extensive soft tissue edema. 2. Associated adjacent myositis as well.       Xray right foot (10/26/18)  IMPRESSION: Significant bone destruction in the midfoot and hindfoot    Signed By: Jose Antonio Eaton MD     October 30, 2018

## 2018-10-31 LAB
ANION GAP SERPL CALC-SCNC: 8 MMOL/L
BASOPHILS # BLD: 0.1 K/UL (ref 0–0.2)
BASOPHILS NFR BLD: 1 % (ref 0–2)
BUN SERPL-MCNC: 16 MG/DL (ref 6–23)
CALCIUM SERPL-MCNC: 8.6 MG/DL (ref 8.3–10.4)
CHLORIDE SERPL-SCNC: 107 MMOL/L (ref 98–107)
CO2 SERPL-SCNC: 25 MMOL/L (ref 21–32)
CREAT SERPL-MCNC: 1.31 MG/DL (ref 0.8–1.5)
DIFFERENTIAL METHOD BLD: ABNORMAL
EOSINOPHIL # BLD: 0.1 K/UL (ref 0–0.8)
EOSINOPHIL NFR BLD: 2 % (ref 0.5–7.8)
ERYTHROCYTE [DISTWIDTH] IN BLOOD BY AUTOMATED COUNT: 15 %
GLUCOSE BLD STRIP.AUTO-MCNC: 151 MG/DL (ref 65–100)
GLUCOSE BLD STRIP.AUTO-MCNC: 190 MG/DL (ref 65–100)
GLUCOSE BLD STRIP.AUTO-MCNC: 218 MG/DL (ref 65–100)
GLUCOSE SERPL-MCNC: 188 MG/DL (ref 65–100)
HCT VFR BLD AUTO: 31.8 % (ref 41.1–50.3)
HGB BLD-MCNC: 10 G/DL (ref 13.6–17.2)
IMM GRANULOCYTES # BLD: 0.3 K/UL (ref 0–0.5)
IMM GRANULOCYTES NFR BLD AUTO: 4 % (ref 0–5)
LYMPHOCYTES # BLD: 1.4 K/UL (ref 0.5–4.6)
LYMPHOCYTES NFR BLD: 18 % (ref 13–44)
MCH RBC QN AUTO: 26.5 PG (ref 26.1–32.9)
MCHC RBC AUTO-ENTMCNC: 31.4 G/DL (ref 31.4–35)
MCV RBC AUTO: 84.4 FL (ref 79.6–97.8)
MONOCYTES # BLD: 0.8 K/UL (ref 0.1–1.3)
MONOCYTES NFR BLD: 10 % (ref 4–12)
NEUTS SEG # BLD: 5.1 K/UL (ref 1.7–8.2)
NEUTS SEG NFR BLD: 65 % (ref 43–78)
NRBC # BLD: 0 K/UL (ref 0–0.2)
PLATELET # BLD AUTO: 367 K/UL (ref 150–450)
PMV BLD AUTO: 9 FL (ref 9.4–12.3)
POTASSIUM SERPL-SCNC: 3.9 MMOL/L (ref 3.5–5.1)
RBC # BLD AUTO: 3.77 M/UL (ref 4.23–5.6)
SODIUM SERPL-SCNC: 140 MMOL/L (ref 136–145)
WBC # BLD AUTO: 7.8 K/UL (ref 4.3–11.1)

## 2018-10-31 PROCEDURE — 82962 GLUCOSE BLOOD TEST: CPT

## 2018-10-31 PROCEDURE — 02HV33Z INSERTION OF INFUSION DEVICE INTO SUPERIOR VENA CAVA, PERCUTANEOUS APPROACH: ICD-10-PCS | Performed by: INTERNAL MEDICINE

## 2018-10-31 PROCEDURE — 80048 BASIC METABOLIC PNL TOTAL CA: CPT

## 2018-10-31 PROCEDURE — 74011250636 HC RX REV CODE- 250/636: Performed by: INTERNAL MEDICINE

## 2018-10-31 PROCEDURE — 74011250637 HC RX REV CODE- 250/637: Performed by: INTERNAL MEDICINE

## 2018-10-31 PROCEDURE — 74011000258 HC RX REV CODE- 258: Performed by: INTERNAL MEDICINE

## 2018-10-31 PROCEDURE — 85025 COMPLETE CBC W/AUTO DIFF WBC: CPT

## 2018-10-31 PROCEDURE — 65270000029 HC RM PRIVATE

## 2018-10-31 PROCEDURE — 74011636637 HC RX REV CODE- 636/637: Performed by: INTERNAL MEDICINE

## 2018-10-31 PROCEDURE — 36569 INSJ PICC 5 YR+ W/O IMAGING: CPT | Performed by: INTERNAL MEDICINE

## 2018-10-31 PROCEDURE — 36415 COLL VENOUS BLD VENIPUNCTURE: CPT

## 2018-10-31 RX ADMIN — HEPARIN SODIUM 5000 UNITS: 5000 INJECTION INTRAVENOUS; SUBCUTANEOUS at 16:01

## 2018-10-31 RX ADMIN — HEPARIN SODIUM 5000 UNITS: 5000 INJECTION INTRAVENOUS; SUBCUTANEOUS at 06:18

## 2018-10-31 RX ADMIN — INSULIN GLARGINE 45 UNITS: 100 INJECTION, SOLUTION SUBCUTANEOUS at 21:49

## 2018-10-31 RX ADMIN — VANCOMYCIN HYDROCHLORIDE 2000 MG: 10 INJECTION, POWDER, LYOPHILIZED, FOR SOLUTION INTRAVENOUS at 07:30

## 2018-10-31 RX ADMIN — INSULIN LISPRO 4 UNITS: 100 INJECTION, SOLUTION INTRAVENOUS; SUBCUTANEOUS at 13:19

## 2018-10-31 RX ADMIN — INSULIN LISPRO 25 UNITS: 100 INJECTION, SOLUTION INTRAVENOUS; SUBCUTANEOUS at 08:50

## 2018-10-31 RX ADMIN — INSULIN LISPRO 25 UNITS: 100 INJECTION, SOLUTION INTRAVENOUS; SUBCUTANEOUS at 13:19

## 2018-10-31 RX ADMIN — Medication 10 ML: at 22:00

## 2018-10-31 RX ADMIN — CEFTRIAXONE SODIUM 2 G: 2 INJECTION, POWDER, FOR SOLUTION INTRAMUSCULAR; INTRAVENOUS at 17:14

## 2018-10-31 RX ADMIN — METRONIDAZOLE 500 MG: 500 TABLET ORAL at 21:49

## 2018-10-31 RX ADMIN — INSULIN LISPRO 2 UNITS: 100 INJECTION, SOLUTION INTRAVENOUS; SUBCUTANEOUS at 21:50

## 2018-10-31 RX ADMIN — ASPIRIN 81 MG CHEWABLE TABLET 81 MG: 81 TABLET CHEWABLE at 07:31

## 2018-10-31 RX ADMIN — METRONIDAZOLE 500 MG: 500 TABLET ORAL at 07:32

## 2018-10-31 RX ADMIN — INSULIN LISPRO 2 UNITS: 100 INJECTION, SOLUTION INTRAVENOUS; SUBCUTANEOUS at 08:50

## 2018-10-31 RX ADMIN — VALSARTAN 320 MG: 320 TABLET, FILM COATED ORAL at 21:49

## 2018-10-31 RX ADMIN — INSULIN LISPRO 25 UNITS: 100 INJECTION, SOLUTION INTRAVENOUS; SUBCUTANEOUS at 17:00

## 2018-10-31 RX ADMIN — LEVOTHYROXINE SODIUM 75 MCG: 75 TABLET ORAL at 06:19

## 2018-10-31 RX ADMIN — PANTOPRAZOLE SODIUM 40 MG: 40 TABLET, DELAYED RELEASE ORAL at 06:19

## 2018-10-31 RX ADMIN — AMLODIPINE BESYLATE 10 MG: 10 TABLET ORAL at 07:31

## 2018-10-31 RX ADMIN — FENOFIBRATE 160 MG: 160 TABLET ORAL at 06:19

## 2018-10-31 NOTE — PROGRESS NOTES
Care Management Interventions Mode of Transport at Discharge: Self Transition of Care Consult (CM Consult): Home Health [facility] - INPATIENT: No 
Reason Outside [place]: Out of service area Physical Therapy Consult: Yes Current Support Network: Own Home Confirm Follow Up Transport: Family Plan discussed with Pt/Family/Caregiver: Yes Freedom of Choice Offered: Yes Discharge Location Discharge Placement: Home with home health I met with patient to help finalize d/c plans. Patient plans to return to his home in [address] [place] [address]. Teodora Fung is also in [place]. His mother lives in this home. Patient aware of the probable need for long term IVAB. He hopes to stay off his foot and manage his own IVAB at home. [employer] researched his coverage and he has home IVAB coverage at 100%. Mr. Ruba Meneses has been doing research on-line at the bedside and requested we use [facility] who he says covers his [facility] in [place]. Our ID doctor is trying to obtain a ID MD to follow him over state lines. Patient prefers ID doctor- Cal Griffin or his partners who's office is in [facility] and has medical privileges at [facility]. I called their office and patient already has an appt with ID doctor- Dr. Angelo Acosta for Dec 4th 2:45pm. Caeuds-[phone] fax # [phone]. Patient does not have PCP in [facility]. He has been managing his dressings on his own with support from [address] [place] [address]. He has never been to a WD clinic in [facility]; but would be willing to pursue if needed. Patient asking for standard walker; does not feel he will need wheels. He denied any other needs. Will follow and wait on final IVAB orders. Jaida Osorio D/c NEEDS:  
1. Send chart info to Dr. Angelo Acosta (ID) 2. Send referrals to Javier in [facility] and [facility] once final orders obtained 3. Arrange standard walker 4.  Wd clinic appt if needed. Patient has wd f/u here in [place] in a couple of weeks

## 2018-10-31 NOTE — PROGRESS NOTES
Bandage to right foot changed per MD order. Tunneling noted from bottom of foot to the open wound on  right side of foot. Patient tolerated well.

## 2018-10-31 NOTE — PROGRESS NOTES
Hospitalist Progress Note 10/31/2018 Admit Date: 10/26/2018  5:05 PM  
NAME: Steven Leyva :  1960 MRN:  [id number] Attending: Rossy Doty DO 
PCP:  Zahra Ordaz MD 
 
SUBJECTIVE:  
Patient pleasant 58M with pmhx of DM, HTN, MO, sleep apnea, hypothyroidism presented with less than one day of subjective fever, chills, and increased right foot swelling and redness. Says he felt in his normal state of health until when he checked fever at home of 100.  Patient with hx of Charcot foot and chronic ulceration, follows with outpatient Wound care, Dr Daphnie Garcia and Dr Tami Jay. Has been on bactrim for several months. XR  With significant bone destruction of midfoot and hindfoot. Was given vanco/Rocephin and cultured. Hospitalist asked to admit for sepsis, diabetic foot ulceration/suspected osteomyelelitis. MRI confirmed osteomyelitis and abscess. Ortho and ID consulted.  flagyl added. Blood cx grew alpha strep. 10/30- reports he is feeling better overall. States swelling of legs at baseline and swelling of R foot getting better. Had bedside I&D of R foot yesterday and no significant abscess found by Dr. Vilma Jiménez. Wound culture yesterday with NGTD. Repeat blood cx 10/28 with NGTD (previous blood cx with Strep anginosus). 10/31- reports he is doing well. Awaiting final ID recs for outpatient IV abx treatment and case management working to secure ID physician in [place], [place]. Repeat blood cx 10/28 NGTD and wound cx from 10/29 NGTD. Review of Systems negative with exception of pertinent positives noted above PHYSICAL EXAM  
 
Visit Vitals /78 (BP 1 Location: Right arm, BP Patient Position: At rest) Pulse 68 Temp 98.5 °F (36.9 °C) Resp 18 Ht 6' (1.829 m) Wt (!) 172.4 kg (380 lb) SpO2 98% BMI 51.54 kg/m² Temp (24hrs), Av.5 °F (36.9 °C), Min:98.3 °F (36.8 °C), Max:98.6 °F (37 °C) Patient Vitals for the past 24 hrs: Temp Pulse Resp BP SpO2  
10/31/18 1152 98.5 °F (36.9 °C) 68 18 145/78 98 % 10/31/18 0723 98.4 °F (36.9 °C) 70 16 145/75 94 % 10/31/18 0436 98.5 °F (36.9 °C) 75 16 150/89 91 % 10/31/18 0005 98.6 °F (37 °C) 76 16 152/78 97 % 10/30/18 1935 98.3 °F (36.8 °C) 76 16 180/74 97 % 10/30/18 1500 98.5 °F (36.9 °C) 75 17 169/81 97 % Oxygen Therapy O2 Sat (%): 98 % (10/31/18 1152) Pulse via Oximetry: 108 beats per minute (10/26/18 1906) O2 Device: Room air (10/29/18 1959) No intake or output data in the 24 hours ending 10/31/18 1418 General: No acute distress, morbidly obese   
Lungs:  CTA Bilaterally. Heart:  Regular rate and rhythm,  No murmur, rub, or gallop Abdomen: Soft, Non distended, Non tender, Positive bowel sounds Extremities: No cyanosis, 2+ pitting edema of legs and feet, R foot wrapped Neurologic:  No focal deficits Recent Results (from the past 24 hour(s)) GLUCOSE, POC Collection Time: 10/30/18  4:14 PM  
Result Value Ref Range Glucose (POC) 149 (H) 65 - 100 mg/dL GLUCOSE, POC Collection Time: 10/30/18  8:33 PM  
Result Value Ref Range Glucose (POC) 135 (H) 65 - 100 mg/dL METABOLIC PANEL, BASIC Collection Time: 10/31/18  4:53 AM  
Result Value Ref Range Sodium 140 136 - 145 mmol/L Potassium 3.9 3.5 - 5.1 mmol/L Chloride 107 98 - 107 mmol/L  
 CO2 25 21 - 32 mmol/L Anion gap 8 mmol/L Glucose 188 (H) 65 - 100 mg/dL BUN 16 6 - 23 MG/DL Creatinine 1.31 0.8 - 1.5 MG/DL  
 GFR est AA >60 >60 ml/min/1.73m2 GFR est non-AA 60 ml/min/1.73m2 Calcium 8.6 8.3 - 10.4 MG/DL  
CBC WITH AUTOMATED DIFF Collection Time: 10/31/18  4:53 AM  
Result Value Ref Range WBC 7.8 4.3 - 11.1 K/uL  
 RBC 3.77 (L) 4.23 - 5.6 M/uL  
 HGB 10.0 (L) 13.6 - 17.2 g/dL HCT 31.8 (L) 41.1 - 50.3 % MCV 84.4 79.6 - 97.8 FL  
 MCH 26.5 26.1 - 32.9 PG  
 MCHC 31.4 31.4 - 35.0 g/dL  
 RDW 15.0 % PLATELET 548 674 - 530 K/uL  MPV 9.0 (L) 9.4 - 12.3 FL  
 ABSOLUTE NRBC 0.00 0.0 - 0.2 K/uL  
 DF AUTOMATED NEUTROPHILS 65 43 - 78 % LYMPHOCYTES 18 13 - 44 % MONOCYTES 10 4.0 - 12.0 % EOSINOPHILS 2 0.5 - 7.8 % BASOPHILS 1 0.0 - 2.0 % IMMATURE GRANULOCYTES 4 0.0 - 5.0 %  
 ABS. NEUTROPHILS 5.1 1.7 - 8.2 K/UL  
 ABS. LYMPHOCYTES 1.4 0.5 - 4.6 K/UL  
 ABS. MONOCYTES 0.8 0.1 - 1.3 K/UL  
 ABS. EOSINOPHILS 0.1 0.0 - 0.8 K/UL  
 ABS. BASOPHILS 0.1 0.0 - 0.2 K/UL  
 ABS. IMM. GRANS. 0.3 0.0 - 0.5 K/UL GLUCOSE, POC Collection Time: 10/31/18 11:52 AM  
Result Value Ref Range Glucose (POC) 218 (H) 65 - 100 mg/dL Imaging: MRI FOOT RT W WO CONT Final Result IMPRESSION:  
1. Fifth metatarsal acute osteomyelitis with adjacent small volume abscess and  
extensive soft tissue edema. 2. Associated adjacent myositis as well. XR FOOT RT MIN 3 V Final Result IMPRESSION: Significant bone destruction in the midfoot and hindfoot. ASSESSMENT Hospital Problems as of 10/31/2018 Date Reviewed: 10/27/2018 Codes Class Noted - Resolved POA Bacteremia due to Streptococcus ICD-10-CM: R78.81, B95.5 ICD-9-CM: 790.7, 041.00  10/30/2018 - Present Yes Acute on chronic renal failure (HCC) ICD-10-CM: N17.9, N18.9 ICD-9-CM: 584.9, 585.9  10/27/2018 - Present Yes Charcot foot due to diabetes mellitus (HCC) (Chronic) ICD-10-CM: E11.610 ICD-9-CM: 250.60, 713.5  10/26/2018 - Present Yes * (Principal) Osteomyelitis of right foot ([facility] ICD-10-CM: M86.9 ICD-9-CM: 730.27  7/22/2015 - Present Yes Sepsis ([facility] ICD-10-CM: A41.9 ICD-9-CM: 038.9, 995.91  7/22/2015 - Present Yes DM type 2 (diabetes mellitus, type 2) (HCC) (Chronic) ICD-10-CM: E11.9 ICD-9-CM: 250.00  7/22/2015 - Present Yes RESOLVED: Acute kidney injury ([facility] ICD-10-CM: N17.9 ICD-9-CM: 584.9  10/26/2018 - 10/27/2018 Plan: 
· Sepsis due to R foot infection/osteomyelitis and Strep anginosus bacteremia · Rocephin, flagyl, vancomycin per ID. · Follow repeat blood cx and foot wound cx- currently NGTD. · ID trying to arrange outpatient abx follow up with group in [place], where he is from. · AoCKD- resolved. Stop IVF. · T2DM- blood sugars controlled on current regimen. · HTN- BP controlled for most part. Continue present bp meds. DVT Prophylaxis: Heparin Signed By: Kemar Hwang MD   
 October 31, 2018

## 2018-10-31 NOTE — PROGRESS NOTES
Infectious Disease Consult Today's Date: 10/31/2018 Admit Date: 10/26/2018 Impression: · Anginosus group streptococcal bacteremia · Right foot severe DFI with acute osteomyelitis of the 5th metatarsal; Dr. Lorna Lam debrided on 10/29/18 and did not encounter abscess · Superficial cellulitis right foot has resolved He has overlapping infection syndromes. He only had a few days of symptoms, presumably from superficial cellulitis and bacteremia. Osteomyelitis has likely been present for longer. I just found out that he lives in [place], [place] and is returning to there when he is discharged. Unfortunately, I will be unable to follow him on IV antibiotics, which I would prefer to do. She will followup with Dr. Cricket Yang with [facility] in [place], [place]. Plan:  
· Continue ceftriaxone 2g daily and metronidazole 500 mg po q 8 hours on discharge · Stop vancomycin · Place PICC · He can leave tomorrow morning after ceftriaxone dose Weekly lab monitoring ordered Anti-infectives: · Ceftriaxone (10/26/18) · Vancomycin (10/26/18) Subjective: No complaints. He is tolerating all antibiotics without side effects. No Known Allergies Review of Systems:  A comprehensive review of systems was negative except for that written in the History of Present Illness. Objective:  
 
Visit Vitals /78 (BP 1 Location: Right arm, BP Patient Position: At rest) Pulse 68 Temp 98.5 °F (36.9 °C) Resp 18 Ht 6' (1.829 m) Wt (!) 172.4 kg (380 lb) SpO2 98% BMI 51.54 kg/m² Temp (24hrs), Av.5 °F (36.9 °C), Min:98.3 °F (36.8 °C), Max:98.6 °F (37 °C) Lines:  Peripheral IV:    
 
Physical Exam:   
General:  Morbidly obese Eyes:  Sclera anicteric. Pupils equally round and reactive to light. Mouth/Throat: Mucous membranes normal, oral pharynx clear Neck: Supple Lungs:   Clear to auscultation bilaterally, good effort CV:  Regular rate and rhythm,no murmur, click, rub or gallop Abdomen:   Soft, non-tender. bowel sounds normal. non-distended Extremities: Right charcot foot;  Cellulitis has resolved. Wound is bandaged and not further examined. Skin: No rash Lymph nodes: Cervical and supraclavicular normal  
Musculoskeletal: Bilateral chronic lower extremity edema Lines/Devices:  Intact, no erythema, drainage or tenderness Psych: Alert and oriented, normal mood affect given the setting Data Review: CBC: 
Recent Labs 10/31/18 
0453 10/29/18 
1036 WBC 7.8 8.6 GRANS 65 78 MONOS 10 6 EOS 2 1 ANEU 5.1 6.7 ABL 1.4 1.2 HGB 10.0* 10.4* HCT 31.8* 32.9*  
 342 BMP: 
Recent Labs 10/31/18 
0453 10/30/18 
0427 10/29/18 
1036 CREA 1.31 1.30 1.49 BUN 16 19 19  141 141  
K 3.9 3.8 4.0  
 108* 107 CO2 25 23 22 AGAP 8 10 12 * 131* 181* LFTS: 
No results for input(s): TBILI, ALT, SGOT, AP, TP, ALB in the last 72 hours. Microbiology:  
 
All Micro Results Procedure Component Value Units Date/Time CULTURE, ANAEROBIC [[id number]] Collected:  10/29/18 1508 Order Status:  Completed Specimen:  Foot Updated:  10/31/18 7231 Special Requests: RIGHT Culture result:    
  NO ANAEROBIC GROWTH IN 1 DAY  
     
 CULTURE, Leilani Chadd STAIN [[id number]] Collected:  10/29/18 1508 Order Status:  Completed Specimen:  Wound from Foot Updated:  10/31/18 1331 Special Requests: --     
  RIGHT 
SWAB 
  
  GRAM STAIN NO DEFINITE ORGANISM SEEN     
   0 TO 5 WBC'S SEEN PER OIF Culture result: NO GROWTH 1 DAY     
 CULTURE, BLOOD [[id number]] Collected:  10/28/18 1037 Order Status:  Completed Specimen:  Blood Updated:  10/31/18 2558 Special Requests: --     
  RIGHT Antecubital 
  
  Culture result: NO GROWTH 3 DAYS     
 CULTURE, BLOOD [[id number]] Collected:  10/28/18 1047 Order Status:  Completed Specimen:  Blood Updated:  10/31/18 0012 Special Requests: --     
  LEFT 
HAND Culture result: NO GROWTH 3 DAYS     
 CULTURE, BLOOD [[id number]]  (Abnormal) Collected:  10/26/18 1746 Order Status:  Completed Specimen:  Blood Updated:  10/30/18 6296 Special Requests: RIGHT FOREARM     
  GRAM STAIN GRAM POSITIVE COCCI ANAEROBIC BOTTLE POSITIVE RESULTS VERIFIED, PHONED TO AND READ BACK BY YRN KEYS [address] [facility] AT [place] [address] ON 10/27/18 [facility] Culture result: ALPHA STREPTOCOCCUS     
   REFER TO ACC NO [id number] FOR ID AND SUSCEPTIBILITY CULTURE, BLOOD [[id number]]  (Abnormal)  (Susceptibility) Collected:  10/26/18 1746 Order Status:  Completed Specimen:  Blood Updated:  10/30/18 4404 Special Requests: RIGHT ANTECUBITAL     
  GRAM STAIN GRAM POSITIVE COCCI ANAEROBIC BOTTLE POSITIVE RESULTS VERIFIED, PHONED TO AND READ BACK BY  YRN KEYS [address] [facility] AT [place] [address] ON 10/27/18 [facility] Culture result: STREPTOCOCCUS ANGINOSUS     
 CULTURE, Nas Li STAIN [[id number]] Order Status:  Canceled Specimen:  Wound from Foot CULTURE, ANAEROBIC [[id number]] Order Status:  Canceled Specimen:  Foot, Right Imaging:  
TTE (10/30/18) -  Left ventricle: Size was at the upper limits of normal. Systolic function was normal. Ejection fraction was estimated in the range of 55 % to 60 % to be greater than 55 %. There were no regional wall motion abnormalities. There Was mild concentric hypertrophy. Left ventricular diastolic function parameters were normal. 
-  Inferior vena cava, hepatic veins: The inferior vena cava was mildly dilated. The respirophasic change in diameter was more than 50%. -  Aortic valve: There was no evidence for vegetation. -  Mitral valve: There was no evidence for vegetation. -  Tricuspid valve: There was trivial regurgitation. There was no evidence for vegetation. MRI right foot (10/27/18) IMPRESSION:  
1. Fifth metatarsal acute osteomyelitis with adjacent small volume abscess and  
extensive soft tissue edema. 2. Associated adjacent myositis as well. Xray right foot (10/26/18) IMPRESSION: Significant bone destruction in the midfoot and hindfoot Signed By: Dustin Villarreal MD   
 October 31, 2018

## 2018-11-01 LAB
ANION GAP SERPL CALC-SCNC: 9 MMOL/L
BACTERIA SPEC CULT: NORMAL
BUN SERPL-MCNC: 13 MG/DL (ref 6–23)
CALCIUM SERPL-MCNC: 8.6 MG/DL (ref 8.3–10.4)
CHLORIDE SERPL-SCNC: 108 MMOL/L (ref 98–107)
CO2 SERPL-SCNC: 25 MMOL/L (ref 21–32)
CREAT SERPL-MCNC: 1.26 MG/DL (ref 0.8–1.5)
GLUCOSE BLD STRIP.AUTO-MCNC: 194 MG/DL (ref 65–100)
GLUCOSE BLD STRIP.AUTO-MCNC: 199 MG/DL (ref 65–100)
GLUCOSE BLD STRIP.AUTO-MCNC: 200 MG/DL (ref 65–100)
GLUCOSE SERPL-MCNC: 143 MG/DL (ref 65–100)
GRAM STN SPEC: NORMAL
GRAM STN SPEC: NORMAL
POTASSIUM SERPL-SCNC: 4 MMOL/L (ref 3.5–5.1)
SERVICE CMNT-IMP: NORMAL
SODIUM SERPL-SCNC: 142 MMOL/L (ref 136–145)

## 2018-11-01 PROCEDURE — 74011636637 HC RX REV CODE- 636/637: Performed by: INTERNAL MEDICINE

## 2018-11-01 PROCEDURE — 80048 BASIC METABOLIC PNL TOTAL CA: CPT

## 2018-11-01 PROCEDURE — 74011250636 HC RX REV CODE- 250/636: Performed by: INTERNAL MEDICINE

## 2018-11-01 PROCEDURE — 74011000258 HC RX REV CODE- 258: Performed by: INTERNAL MEDICINE

## 2018-11-01 PROCEDURE — 74011250637 HC RX REV CODE- 250/637: Performed by: INTERNAL MEDICINE

## 2018-11-01 PROCEDURE — C1751 CATH, INF, PER/CENT/MIDLINE: HCPCS

## 2018-11-01 PROCEDURE — 76937 US GUIDE VASCULAR ACCESS: CPT

## 2018-11-01 PROCEDURE — 36415 COLL VENOUS BLD VENIPUNCTURE: CPT

## 2018-11-01 PROCEDURE — 65270000029 HC RM PRIVATE

## 2018-11-01 PROCEDURE — 82962 GLUCOSE BLOOD TEST: CPT

## 2018-11-01 RX ORDER — HEPARIN 100 UNIT/ML
300 SYRINGE INTRAVENOUS AS NEEDED
Status: DISCONTINUED | OUTPATIENT
Start: 2018-11-01 | End: 2018-11-02 | Stop reason: HOSPADM

## 2018-11-01 RX ORDER — HEPARIN 100 UNIT/ML
300 SYRINGE INTRAVENOUS EVERY 8 HOURS
Status: DISCONTINUED | OUTPATIENT
Start: 2018-11-01 | End: 2018-11-02 | Stop reason: HOSPADM

## 2018-11-01 RX ORDER — SODIUM CHLORIDE 0.9 % (FLUSH) 0.9 %
10 SYRINGE (ML) INJECTION EVERY 8 HOURS
Status: DISCONTINUED | OUTPATIENT
Start: 2018-11-01 | End: 2018-11-02 | Stop reason: HOSPADM

## 2018-11-01 RX ORDER — SODIUM CHLORIDE 0.9 % (FLUSH) 0.9 %
10 SYRINGE (ML) INJECTION AS NEEDED
Status: DISCONTINUED | OUTPATIENT
Start: 2018-11-01 | End: 2018-11-02 | Stop reason: HOSPADM

## 2018-11-01 RX ADMIN — HEPARIN SODIUM 5000 UNITS: 5000 INJECTION INTRAVENOUS; SUBCUTANEOUS at 16:00

## 2018-11-01 RX ADMIN — METRONIDAZOLE 500 MG: 500 TABLET ORAL at 21:52

## 2018-11-01 RX ADMIN — INSULIN LISPRO 2 UNITS: 100 INJECTION, SOLUTION INTRAVENOUS; SUBCUTANEOUS at 16:30

## 2018-11-01 RX ADMIN — FENOFIBRATE 160 MG: 160 TABLET ORAL at 06:16

## 2018-11-01 RX ADMIN — INSULIN GLARGINE 45 UNITS: 100 INJECTION, SOLUTION SUBCUTANEOUS at 21:52

## 2018-11-01 RX ADMIN — LEVOTHYROXINE SODIUM 75 MCG: 75 TABLET ORAL at 06:16

## 2018-11-01 RX ADMIN — AMLODIPINE BESYLATE 10 MG: 10 TABLET ORAL at 09:08

## 2018-11-01 RX ADMIN — METRONIDAZOLE 500 MG: 500 TABLET ORAL at 09:08

## 2018-11-01 RX ADMIN — HEPARIN SODIUM 5000 UNITS: 5000 INJECTION INTRAVENOUS; SUBCUTANEOUS at 00:07

## 2018-11-01 RX ADMIN — INSULIN LISPRO 25 UNITS: 100 INJECTION, SOLUTION INTRAVENOUS; SUBCUTANEOUS at 13:14

## 2018-11-01 RX ADMIN — INSULIN LISPRO 25 UNITS: 100 INJECTION, SOLUTION INTRAVENOUS; SUBCUTANEOUS at 09:08

## 2018-11-01 RX ADMIN — CEFTRIAXONE SODIUM 2 G: 2 INJECTION, POWDER, FOR SOLUTION INTRAMUSCULAR; INTRAVENOUS at 06:16

## 2018-11-01 RX ADMIN — HEPARIN SODIUM 5000 UNITS: 5000 INJECTION INTRAVENOUS; SUBCUTANEOUS at 23:29

## 2018-11-01 RX ADMIN — PANTOPRAZOLE SODIUM 40 MG: 40 TABLET, DELAYED RELEASE ORAL at 06:16

## 2018-11-01 RX ADMIN — INSULIN LISPRO 2 UNITS: 100 INJECTION, SOLUTION INTRAVENOUS; SUBCUTANEOUS at 21:52

## 2018-11-01 RX ADMIN — VALSARTAN 320 MG: 320 TABLET, FILM COATED ORAL at 21:52

## 2018-11-01 RX ADMIN — SODIUM CHLORIDE, PRESERVATIVE FREE 300 UNITS: 5 INJECTION INTRAVENOUS at 14:00

## 2018-11-01 RX ADMIN — HEPARIN SODIUM 5000 UNITS: 5000 INJECTION INTRAVENOUS; SUBCUTANEOUS at 09:12

## 2018-11-01 RX ADMIN — ASPIRIN 81 MG CHEWABLE TABLET 81 MG: 81 TABLET CHEWABLE at 09:08

## 2018-11-01 RX ADMIN — INSULIN LISPRO 25 UNITS: 100 INJECTION, SOLUTION INTRAVENOUS; SUBCUTANEOUS at 17:00

## 2018-11-01 NOTE — PROGRESS NOTES
Problem: Nutrition Deficit Goal: *Optimize nutritional status Nutrition Reason for assessment:  Length of stay Assessment:  
Diet order(s):  Consistent CHO Pt presented with < 1 day subjective fevers, chills and increased right foot swelling and redness. Past medical history notable for DM, HTN, Charcot foot with chronic ulceration-followed by outpatient wound clinic. Food/Nutrition Patient History:  Pt reports good po intake prior to admission; pt either prepares own meals at home or goes out to eat. No nutrition risk factors identified on admission malnutrition screening tool. Pt endorses good po intake currently. Anthropometrics:Height: 6' (182.9 cm),  Weight: (!) 172.4 kg (380 lb), Weight source not specified, Body mass index is 51.54 kg/m². BMI class of Extreme Obesity 111. Macronutrient needs: EER:  9512-1961 kcal /day (11-14 kcal/kg BW) EPR:   grams protein/day (1.2-1.4grams/kg IBW) GFR > 60 Intake/Comparative Standards: 3 recorded intake at 100%. Pt potentially meets ~ 95% estimated kcal needs and 75% estimated protein needs. Nutrition Diagnosis: Increased nutrient needs r/t wound healing as evidenced by chronic right food ulcers. Intervention: 
Meals and snacks: Continue current diet. Nutrition Supplement Therapy: Initiated Glucerna Shake supplement once daily Discharge nutrition plan: Continue current diet. Talita Weaver, MPH, [address] [place] [address],  
[phone]

## 2018-11-01 NOTE — PROGRESS NOTES
Shift assessment complete. Bed low to ground and call light within reach. Pt able to dorsi/plantar flex bilaterally with +2 pulses. No complaint of pain.

## 2018-11-01 NOTE — PROGRESS NOTES
ID gave final home IVAB orders for rocephin 2gm q24 for EOT 12-7. Patient tells me he will not be ready for discharge until Friday 11-2. He plans to drive self with use of his boot. (3 hour drive)  Dr. Rylie Tejada has arranged for an ID doctor to follow in [facility] named Dr. Chris Jay [phone]. Patient has appt scheduled for Dec. 4th at 2:45pm  Chart info faxed to their office to [phone]. Office confirms their doctor will follow and is aware of IVAB orders. They recommended we use [facility] in [facility] to provide home infusion. Patient did not have a preference towards provider so IVAB referral called and faxed to their office. [facility] [phone] and their fax is [phone]. They said they would review and call me back once they check benefits. They are in-network with [employer]. I requested a start of care in the home on Sat 11-6. Patient will be given rocephin dose in hospital Friday am before d/c. PICC line placed today 11-1. MD and wound RN recommending WD care f/u. Patient does not have PCP in [facility]. I have initiated a referral to a wound clinic in [place], [place]. Patient agreed with plan. I called the WD clinic affliated with the local hospital - [facility]. [phone]. Fax # [phone]. Nate Kimball with wd clinic called back with an appt for 11-5-18 at 1:00pm.  Patient given written and verbal instructions for all appts. Copy also placed on paper chart. Lyndsay Manjarrez

## 2018-11-01 NOTE — PROGRESS NOTES
Hospitalist Progress Note   
2018 Admit Date: 10/26/2018  5:05 PM  
NAME: Vivienne Mueller :  1960 MRN:  [id number] Attending: Jad Lyons DO 
PCP:  Laura Franco MD 
 
SUBJECTIVE:  
Patient pleasant 58M with pmhx of DM, HTN, MO, sleep apnea, hypothyroidism presented with less than one day of subjective fever, chills, and increased right foot swelling and redness. Says he felt in his normal state of health until when he checked fever at home of 100.  Patient with hx of Charcot foot and chronic ulceration, follows with outpatient Wound care, Dr Yazmin Will and Dr Hussain Maharaj. Has been on bactrim for several months. XR  With significant bone destruction of midfoot and hindfoot. Was given vanco/Rocephin and cultured. Hospitalist asked to admit for sepsis, diabetic foot ulceration/suspected osteomyelelitis. MRI confirmed osteomyelitis and abscess. Ortho and ID consulted.  flagyl added. Blood cx grew alpha strep. 10/30- reports he is feeling better overall. States swelling of legs at baseline and swelling of R foot getting better. Had bedside I&D of R foot yesterday and no significant abscess found by Dr. Brady Henson. Wound culture yesterday with NGTD. Repeat blood cx 10/28 with NGTD (previous blood cx with Strep anginosus). 10/31- reports he is doing well. Awaiting final ID recs for outpatient IV abx treatment and case management working to secure ID physician in [place]. Repeat blood cx 10/28 NGTD and wound cx from 10/29 NGTD. 
 
- feeling well. OPAT orders finalized by ID with IV ceftriaxone daily and PO flagyl with EOT 18. Outpatient follow up arranged with ID physician and wound care in [place]. Awaiting insurance approval for ceftriaxone infusion. Wound cx from R foot with no growth and repeat blood cx 10/28 negative. Has PICC. Family bringing him clothes and necessities for travel tonight (will not get her until 1 AM or after). Review of Systems negative with exception of pertinent positives noted above PHYSICAL EXAM  
 
Visit Vitals /75 (BP 1 Location: Left arm, BP Patient Position: At rest) Pulse 75 Temp 98.6 °F (37 °C) Resp 18 Ht 6' (1.829 m) Wt (!) 172.4 kg (380 lb) SpO2 98% BMI 51.54 kg/m² Temp (24hrs), Av.2 °F (36.8 °C), Min:96.8 °F (36 °C), Max:98.7 °F (37.1 °C) Patient Vitals for the past 24 hrs: 
 Temp Pulse Resp BP SpO2  
18 1125 98.6 °F (37 °C) 75 18 135/75 98 % 18 0703 98.4 °F (36.9 °C) 75 16 121/65 98 % 18 0341 98.5 °F (36.9 °C) 70 16 147/73 92 % 10/31/18 2335 98.7 °F (37.1 °C) 70 18 148/78 98 % 10/31/18 1900 96.8 °F (36 °C) 75 16 144/73 94 % 10/31/18 1530 98.4 °F (36.9 °C) 71 16 165/81 96 % Oxygen Therapy O2 Sat (%): 98 % (18 1125) Pulse via Oximetry: 108 beats per minute (10/26/18 1906) O2 Device: Room air (10/29/18 1959) Intake/Output Summary (Last 24 hours) at 2018 1425 Last data filed at 2018 0630 Gross per 24 hour Intake 920 ml Output 1950 ml Net -1030 ml General: No acute distress, morbidly obese   
Lungs:  CTA Bilaterally. Heart:  Regular rate and rhythm,  No murmur, rub, or gallop Abdomen: Soft, Non distended, Non tender, Positive bowel sounds Extremities: No cyanosis, 2+ pitting edema of legs and feet, R foot wrapped, L foot with 4th and 5th toes amputated Neurologic:  No focal deficits Recent Results (from the past 24 hour(s)) GLUCOSE, POC Collection Time: 10/31/18  4:54 PM  
Result Value Ref Range Glucose (POC) 151 (H) 65 - 100 mg/dL GLUCOSE, POC Collection Time: 10/31/18  8:03 PM  
Result Value Ref Range Glucose (POC) 190 (H) 65 - 100 mg/dL METABOLIC PANEL, BASIC Collection Time: 18  5:24 AM  
Result Value Ref Range Sodium 142 136 - 145 mmol/L Potassium 4.0 3.5 - 5.1 mmol/L Chloride 108 (H) 98 - 107 mmol/L  
 CO2 25 21 - 32 mmol/L  Anion gap 9 mmol/L  
 Glucose 143 (H) 65 - 100 mg/dL BUN 13 6 - 23 MG/DL Creatinine 1.26 0.8 - 1.5 MG/DL  
 GFR est AA >60 >60 ml/min/1.73m2 GFR est non-AA >60 ml/min/1.73m2 Calcium 8.6 8.3 - 10.4 MG/DL  
GLUCOSE, POC Collection Time: 11/01/18 11:30 AM  
Result Value Ref Range Glucose (POC) 200 (H) 65 - 100 mg/dL Imaging: MRI FOOT RT W WO CONT Final Result IMPRESSION:  
1. Fifth metatarsal acute osteomyelitis with adjacent small volume abscess and  
extensive soft tissue edema. 2. Associated adjacent myositis as well. XR FOOT RT MIN 3 V Final Result IMPRESSION: Significant bone destruction in the midfoot and hindfoot. ASSESSMENT Hospital Problems as of 11/1/2018 Date Reviewed: 10/27/2018 Codes Class Noted - Resolved POA Bacteremia due to Streptococcus ICD-10-CM: R78.81, B95.5 ICD-9-CM: 790.7, 041.00  10/30/2018 - Present Yes Acute on chronic renal failure (HCC) ICD-10-CM: N17.9, N18.9 ICD-9-CM: 584.9, 585.9  10/27/2018 - Present Yes Charcot foot due to diabetes mellitus (HCC) (Chronic) ICD-10-CM: E11.610 ICD-9-CM: 250.60, 713.5  10/26/2018 - Present Yes * (Principal) Osteomyelitis of right foot ([facility] ICD-10-CM: M86.9 ICD-9-CM: 730.27  7/22/2015 - Present Yes Sepsis ([facility].) ICD-10-CM: A41.9 ICD-9-CM: 038.9, 995.91  7/22/2015 - Present Yes DM type 2 (diabetes mellitus, type 2) (HCC) (Chronic) ICD-10-CM: E11.9 ICD-9-CM: 250.00  7/22/2015 - Present Yes RESOLVED: Acute kidney injury ([facility].) ICD-10-CM: N17.9 ICD-9-CM: 584.9  10/26/2018 - 10/27/2018 Plan: 
· Sepsis due to R foot infection/osteomyelitis and Strep anginosus bacteremia · IV Rocephin and PO flagyl per ID EOT 12/7/18. · AoCKD- resolved. · T2DM- blood sugars controlled on current regimen. · HTN- BP controlled for most part. Continue present bp meds. Dispo- home tomorrow. DVT Prophylaxis: Heparin Signed By: Lori Levine MD   
 November 1, 2018

## 2018-11-01 NOTE — WOUND CARE
Patient seen per nurse request. Dressing to right foot completed as ordered. Packed newest lateral foot opening as well. May leave tomorrow for [place]. And has appt at a wound clinic in [place], [place]. Continue present cares for now. Discussed with staff.

## 2018-11-01 NOTE — PROGRESS NOTES
PICC Placement Note PRE-PROCEDURE VERIFICATION Correct Procedure: yes. Time out completed with assistant Gutierrez Brooks RN and all persons present in agreement with time out. Correct Site:  yes Temperature: Temp: 98.4 °F (36.9 °C), Temperature Source: Temp Source: Oral 
Recent Labs 11/01/18 
[id number] 10/31/18 
2397 BUN 13 16 CREA 1.26 1.31  
PLT  --  367 WBC  --  7.8 Allergies: Patient has no known allergies. Education materials for [facility] given to patient or family. PROCEDURE DETAIL A single lumen PICC line was started for antibiotic therapy. The following documentation is in addition to the PICC properties in the lines/airways flowsheet : 
Lot #: [id number] 
xylocaine used: yes Mid-Arm Circumference: 47 (cm) Internal Catheter Length: 44 (cm) Internal Catheter Total Length: 44 (cm) Vein Selection for PICC:right cephalic Central Line Bundle followed yes Complication Related to Insertion: none Both the insertion guidewire and ECG guidewire were removed intact all ports have positive blood return and were flush well with normal saline. The location of the tip of the PICC is verified using ECG technology. The tip is in the SVC per ECG reading. See image below. Line is okay to use: yes

## 2018-11-02 VITALS
HEIGHT: 72 IN | SYSTOLIC BLOOD PRESSURE: 152 MMHG | RESPIRATION RATE: 18 BRPM | WEIGHT: 315 LBS | TEMPERATURE: 98.3 F | DIASTOLIC BLOOD PRESSURE: 82 MMHG | OXYGEN SATURATION: 96 % | HEART RATE: 74 BPM | BODY MASS INDEX: 42.66 KG/M2

## 2018-11-02 LAB
ANION GAP SERPL CALC-SCNC: 9 MMOL/L
BACTERIA SPEC CULT: NORMAL
BACTERIA SPEC CULT: NORMAL
BUN SERPL-MCNC: 14 MG/DL (ref 6–23)
CALCIUM SERPL-MCNC: 8.7 MG/DL (ref 8.3–10.4)
CHLORIDE SERPL-SCNC: 105 MMOL/L (ref 98–107)
CO2 SERPL-SCNC: 26 MMOL/L (ref 21–32)
CREAT SERPL-MCNC: 1.18 MG/DL (ref 0.8–1.5)
GLUCOSE BLD STRIP.AUTO-MCNC: 136 MG/DL (ref 65–100)
GLUCOSE SERPL-MCNC: 129 MG/DL (ref 65–100)
POTASSIUM SERPL-SCNC: 3.8 MMOL/L (ref 3.5–5.1)
SERVICE CMNT-IMP: NORMAL
SERVICE CMNT-IMP: NORMAL
SODIUM SERPL-SCNC: 140 MMOL/L (ref 136–145)

## 2018-11-02 PROCEDURE — 74011250636 HC RX REV CODE- 250/636: Performed by: INTERNAL MEDICINE

## 2018-11-02 PROCEDURE — 74011250637 HC RX REV CODE- 250/637: Performed by: INTERNAL MEDICINE

## 2018-11-02 PROCEDURE — 74011636637 HC RX REV CODE- 636/637: Performed by: INTERNAL MEDICINE

## 2018-11-02 PROCEDURE — 74011000258 HC RX REV CODE- 258: Performed by: INTERNAL MEDICINE

## 2018-11-02 PROCEDURE — 80048 BASIC METABOLIC PNL TOTAL CA: CPT

## 2018-11-02 PROCEDURE — 82962 GLUCOSE BLOOD TEST: CPT

## 2018-11-02 RX ORDER — METRONIDAZOLE 500 MG/1
500 TABLET ORAL EVERY 12 HOURS
Qty: 70 TAB | Refills: 0 | Status: SHIPPED | OUTPATIENT
Start: 2018-11-02 | End: 2018-12-07

## 2018-11-02 RX ORDER — GABAPENTIN 100 MG/1
100 CAPSULE ORAL
Qty: 90 CAP | Refills: 0 | Status: SHIPPED | OUTPATIENT
Start: 2018-11-02

## 2018-11-02 RX ADMIN — SODIUM CHLORIDE, PRESERVATIVE FREE 300 UNITS: 5 INJECTION INTRAVENOUS at 05:57

## 2018-11-02 RX ADMIN — HEPARIN SODIUM 5000 UNITS: 5000 INJECTION INTRAVENOUS; SUBCUTANEOUS at 08:00

## 2018-11-02 RX ADMIN — LEVOTHYROXINE SODIUM 75 MCG: 75 TABLET ORAL at 06:28

## 2018-11-02 RX ADMIN — METRONIDAZOLE 500 MG: 500 TABLET ORAL at 08:21

## 2018-11-02 RX ADMIN — PANTOPRAZOLE SODIUM 40 MG: 40 TABLET, DELAYED RELEASE ORAL at 06:28

## 2018-11-02 RX ADMIN — CEFTRIAXONE SODIUM 2 G: 2 INJECTION, POWDER, FOR SOLUTION INTRAMUSCULAR; INTRAVENOUS at 05:36

## 2018-11-02 RX ADMIN — INSULIN LISPRO 25 UNITS: 100 INJECTION, SOLUTION INTRAVENOUS; SUBCUTANEOUS at 08:26

## 2018-11-02 RX ADMIN — FENOFIBRATE 160 MG: 160 TABLET ORAL at 06:28

## 2018-11-02 RX ADMIN — ASPIRIN 81 MG CHEWABLE TABLET 81 MG: 81 TABLET CHEWABLE at 08:21

## 2018-11-02 RX ADMIN — AMLODIPINE BESYLATE 10 MG: 10 TABLET ORAL at 08:21

## 2018-11-02 RX ADMIN — Medication 10 ML: at 05:57

## 2018-11-02 NOTE — DISCHARGE INSTRUCTIONS
DISCHARGE SUMMARY from Nurse    PATIENT INSTRUCTIONS:    After general anesthesia or intravenous sedation, for 24 hours or while taking prescription Narcotics:  · Limit your activities  · Do not drive and operate hazardous machinery  · Do not make important personal or business decisions  · Do  not drink alcoholic beverages  · If you have not urinated within 8 hours after discharge, please contact your surgeon on call. Report the following to your surgeon:  · Excessive pain, swelling, redness or odor of or around the surgical area  · Temperature over 100.5  · Nausea and vomiting lasting longer than 4 hours or if unable to take medications  · Any signs of decreased circulation or nerve impairment to extremity: change in color, persistent  numbness, tingling, coldness or increase pain  · Any questions, follow up with PCP. What to do at Home:  Recommended activity: Activity as tolerated. Follow up as scheduled. Resume ADA diet. *  Please give a list of your current medications to your Primary Care Provider. *  Please update this list whenever your medications are discontinued, doses are      changed, or new medications (including over-the-counter products) are added. *  Please carry medication information at all times in case of emergency situations. Learning About Foot and [address] your loved one's feet and keeping them clean and soft can help prevent cracks and infection in the skin. This is especially important for people who have diabetes. Keeping toenails trimmed--and polished if that's what the person likes--also helps the person feel well-groomed. If the person you care for has diabetes or has foot problems, such as bad bunions and corns, think about taking them to see a podiatrist. This is a doctor who specializes in the care of the feet. Sometimes a podiatrist will come to the home if the person can't go out for visits.   Try to take the person for salon pedicures if that is what they want. It's a chance to get out and see people and continue a favorite activity. You can do basic nail care at home. Usually all you need to do is keep the nails clean and at a safe length. How do you trim someone's toenails? Try to trim the person's nails every week. Or check the nails each week to see if they need to be trimmed. It's easiest to trim nails after the person has had a shower or foot bath. It makes the nails softer and easier to trim. Start by gathering your supplies. You will need toenail clippers and a nail file. You may also need nail polish and nail polish remover. To trim the nails:  6. Wash and dry your hands. You don't need to wear gloves. 7. Use nail polish remover to take off any polish. 8. Hold the person's foot and toe steady with one hand while you trim the nail with your other hand. Trim the nails straight across. Leave the nails a little longer at the corners so that the sharp ends don't cut into the skin. 9. Keep the nails no longer than the tip of the toes. 10. Let the nails dry if they are still damp and soft. 11. Use a nail file to gently smooth the edges of the nails, especially at the corners. They may be sharp after the nails are cut straight. 12. Apply nail polish, if the person wants it. If the person's nails are thick and discolored, it may be safest to have a podiatrist cut them. What else do you need to know? When you're caring for someone's nails, it is important to remember not to trim or cut the cuticles. A minor cut in a cuticle could lead to an infection. Wash the feet daily in the shower or bath or in a basin made for washing feet. It's extra important to wash the feet carefully if the person has diabetes. After washing the feet, dry gently. Put lotion on the feet, especially on the heels. But don't put it between the toes.   If the person doesn't have diabetes and you see signs of athlete's foot (such as dry, cracking, or itchy skin between the toes), you can try an over-the-counter medicine. These medicines can kill the fungus that causes athlete's foot. If the problem doesn't go away, talk to the person's doctor. Look every day for cuts or signs of infection, such as pain, swelling, redness, or warmth. If you see any of these signs--especially in someone who has diabetes--call the doctor. Where can you learn more? Go to http://star-karin.info/. Enter A726 in the search box to learn more about \"Learning About Foot and Toenail Care. \"  Current as of: April 19, 2018  Content Version: 11.8  © 3436-5872 [employer]. Care instructions adapted under license by [employer] (which disclaims liability or warranty for this information). If you have questions about a medical condition or this instruction, always ask your healthcare professional. [place] [address] any warranty or liability for your use of this information. These are general instructions for a healthy lifestyle:    No smoking/ No tobacco products/ Avoid exposure to second hand smoke  Surgeon General's Warning:  Quitting smoking now greatly reduces serious risk to your health. Obesity, smoking, and sedentary lifestyle greatly increases your risk for illness    A healthy diet, regular physical exercise & weight monitoring are important for maintaining a healthy lifestyle    You may be retaining fluid if you have a history of heart failure or if you experience any of the following symptoms:  Weight gain of 3 pounds or more overnight or 5 pounds in a week, increased swelling in our hands or feet or shortness of breath while lying flat in bed. Please call your doctor as soon as you notice any of these symptoms; do not wait until your next office visit.     Recognize signs and symptoms of STROKE:    F-face looks uneven    A-arms unable to move or move unevenly    S-speech slurred or non-existent    T-time-call 911 as soon as signs and symptoms begin-DO NOT go       Back to bed or wait to see if you get better-TIME IS BRAIN. Warning Signs of HEART ATTACK     Call 911 if you have these symptoms:   Chest discomfort. Most heart attacks involve discomfort in the center of the chest that lasts more than a few minutes, or that goes away and comes back. It can feel like uncomfortable pressure, squeezing, fullness, or pain.  Discomfort in other areas of the upper body. Symptoms can include pain or discomfort in one or both arms, the back, neck, jaw, or stomach.  Shortness of breath with or without chest discomfort.  Other signs may include breaking out in a cold sweat, nausea, or lightheadedness. Don't wait more than five minutes to call 911 - MINUTES MATTER! Fast action can save your life. Calling 911 is almost always the fastest way to get lifesaving treatment. Emergency Medical Services staff can begin treatment when they arrive -- up to an hour sooner than if someone gets to the hospital by car. The discharge information has been reviewed with the patient. The patient verbalized understanding. Discharge medications reviewed with the patient and appropriate educational materials and side effects teaching were provided.   ___________________________________________________________________________________________________________________________________

## 2018-11-02 NOTE — DISCHARGE SUMMARY
Hospitalist Discharge Summary     Patient ID:  Casa Evans  [id number]  28 y.o.  1960  Admit date: 10/26/2018  5:05 PM  Discharge date and time: 11/2/2018  Attending: Giorgio Benavidez DO  PCP:  Aparna Mendoza MD  Treatment Team: Attending Provider: Giorgio Benavidez DO; Consulting Provider: Dedra Jhaveri MD; Care Manager: Clovis Mukherjee; Consulting Provider: Shantelle Walters MD; Utilization Review: Dulce Brock RN    Principal Diagnosis Osteomyelitis of right foot [facility])   Hospital Problems as of 11/2/2018 Date Reviewed: 10/27/2018          Codes Class Noted - Resolved POA    Bacteremia due to Streptococcus ICD-10-CM: R78.81, B95.5  ICD-9-CM: 790.7, 041.00  10/30/2018 - Present Yes        Acute on chronic renal failure [facility]) ICD-10-CM: N17.9, N18.9  ICD-9-CM: 584.9, 585.9  10/27/2018 - Present Yes        Charcot foot due to diabetes mellitus ([facility] (Chronic) ICD-10-CM: E11.610  ICD-9-CM: 250.60, 713.5  10/26/2018 - Present Yes        * (Principal) Osteomyelitis of right foot ([facility] ICD-10-CM: M86.9  ICD-9-CM: 730.27  7/22/2015 - Present Yes        Sepsis ([facility] ICD-10-CM: A41.9  ICD-9-CM: 038.9, 995.91  7/22/2015 - Present Yes        DM type 2 (diabetes mellitus, type 2) ([facility] (Chronic) ICD-10-CM: E11.9  ICD-9-CM: 250.00  7/22/2015 - Present Yes        RESOLVED: Acute kidney injury [facility]) ICD-10-CM: N17.9  ICD-9-CM: 584.9  10/26/2018 - 10/27/2018                  HPI:  'Patient pleasant 58M with pmhx of DM, HTN, MO, sleep apnea, hypothyroidism presented with less than one day of subjective fever, chills, and increased right foot swelling and redness. Says he felt in his normal state of health until today when he checked fever at home of 100.0. Patient with hx of Charcot foot and chronic ulceration, follows with outpatient Wound care, Dr Inez Reeves and Dr Jessica Foster. Has been on bactrim for several months.    ED workup notable for WBC 11K, Cr 2.19 (up from baseline of 1.4), XR  With significant bone destruction of midfoot and hindfoot. , /59. Was given vanco/Rocephin in ED and cultured. Hospitalist asked to admit for sepsis, diabetic foot ulceration/suspected osteomyelelitis.'      Hospital Course:  He was admitted and started on IV antibiotics. Blood cultures from admission grew Strep anginosus and repeat blood cultures from 10/28 were negative. ID consulted and PICC line placed on 11/1. He was found to have osteomyelitis of 5th metatarsal.  Had overlying area of abscess that was drained bedside on 10/29 by ortho. Wound cultures negative. Due to overlapping infection syndromes, Infectious Disease recommends for IV rocephin 2 gm daily and  PO metronidazole with EOT 12/7/18. Arrangement has been made for him to follow up with Dr. Eusebio Ortega (infectious disease in [place], [place]). His diabetes remained controlled during hospitalization. He did complain of 'deep itching' of R foot and at times hands. He will be started on gabapentin 100 mg TID PRN on discharge. Significant Diagnostic Studies:       Labs: Results:       Chemistry Recent Labs     11/02/18  0604 11/01/18  0524 10/31/18  0453   * 143* 188*    142 140   K 3.8 4.0 3.9    108* 107   CO2 26 25 25   BUN 14 13 16   CREA 1.18 1.26 1.31   CA 8.7 8.6 8.6   AGAP 9 9 8      CBC w/Diff Recent Labs     10/31/18  0453   WBC 7.8   RBC 3.77*   HGB 10.0*   HCT 31.8*      GRANS 65   LYMPH 18   EOS 2      Cardiac Enzymes No results for input(s): CPK, CKND1, VINCENT in the last 72 hours. No lab exists for component: CKRMB, TROIP   Coagulation No results for input(s): PTP, INR, APTT in the last 72 hours. No lab exists for component: INREXT    Lipid Panel No results found for: CHOL, CHOLPOCT, CHOLX, CHLST, CHOLV, 592870, HDL, LDL, LDLC, DLDLP, 685825, VLDLC, VLDL, TGLX, TRIGL, TRIGP, TGLPOCT, CHHD, CHHDX   BNP No results for input(s): BNPP in the last 72 hours. Liver Enzymes No results for input(s): TP, ALB, TBIL, AP, SGOT, GPT in the last 72 hours.     No lab exists for component: DBIL   Thyroid Studies Lab Results   Component Value Date/Time    TSH 2.094 07/22/2015 10:58 AM          All Micro Results     Procedure Component Value Units Date/Time    CULTURE, BLOOD [[id number]] Collected:  10/28/18 1047    Order Status:  Completed Specimen:  Blood Updated:  11/02/18 0852     Special Requests: --        LEFT  HAND       Culture result: NO GROWTH 5 DAYS       CULTURE, BLOOD [[id number]] Collected:  10/28/18 1037    Order Status:  Completed Specimen:  Blood Updated:  11/02/18 0852     Special Requests: --        RIGHT  Antecubital       Culture result: NO GROWTH 5 DAYS       CULTURE, Ivonne Amy STAIN [[id number]] Collected:  10/29/18 1508    Order Status:  Completed Specimen:  Wound from Foot Updated:  11/01/18 0647     Special Requests: --        RIGHT  SWAB       GRAM STAIN NO DEFINITE ORGANISM SEEN         0 TO 5 WBC'S SEEN PER OIF     Culture result: NO GROWTH 2 DAYS       CULTURE, ANAEROBIC [[id number]] Collected:  10/29/18 1508    Order Status:  Completed Specimen:  Foot Updated:  10/31/18 0927     Special Requests: RIGHT        Culture result:       NO ANAEROBIC GROWTH IN 1 DAY          CULTURE, BLOOD [[id number]]  (Abnormal) Collected:  10/26/18 1746    Order Status:  Completed Specimen:  Blood Updated:  10/30/18 0651     Special Requests: RIGHT FOREARM        GRAM STAIN GRAM POSITIVE COCCI         ANAEROBIC BOTTLE POSITIVE               RESULTS VERIFIED, PHONED TO AND READ BACK BY YRN KEYS 3RD FLOOR AT 2833 ON 10/27/18 [facility]           Culture result: ALPHA STREPTOCOCCUS         REFER TO [address] NO [id number] FOR ID AND SUSCEPTIBILITY    CULTURE, BLOOD [[id number]]  (Abnormal)  (Susceptibility) Collected:  10/26/18 1746    Order Status:  Completed Specimen:  Blood Updated:  10/30/18 0650     Special Requests: RIGHT ANTECUBITAL        GRAM STAIN GRAM POSITIVE COCCI ANAEROBIC BOTTLE POSITIVE               RESULTS VERIFIED, PHONED TO AND READ BACK BY  YRN KEYS 3RD FLOOR AT 6140 ON 10/27/18 [facility]           Culture result: STREPTOCOCCUS ANGINOSUS       CULTURE, WOUND Kell Rey [[id number]]     Order Status:  Canceled Specimen:  Wound from Foot     CULTURE, ANAEROBIC [[id number]]     Order Status:  Canceled Specimen:  Foot, Right         Imaging:    MRI FOOT RT W WO CONT   Final Result   IMPRESSION:   1. Fifth metatarsal acute osteomyelitis with adjacent small volume abscess and   extensive soft tissue edema. 2. Associated adjacent myositis as well. XR FOOT RT MIN 3 V   Final Result   IMPRESSION: Significant bone destruction in the midfoot and hindfoot. Results for orders placed or performed during the hospital encounter of 10/26/18   2D ECHO COMPLETE ADULT (TTE) W OR WO CONTR    Narrative    [address] [facility] [address], [address]  [phone]    Transthoracic Echocardiogram  2D, M-mode, Doppler, and Color Doppler    Patient: Aleyda Gaona  MR #: [id number]  : 1960  Age: 62 years  Gender: Male  Study date: 29-Oct-2018  Account #: [de-identified]  Height: 72 in  Weight: 379.3 lb  BSA: 2.8 mï¾²  Status:Routine  Location:  338  BP: 135/ 70    Allergies: NO KNOWN ALLERGENS    Sonographer:  Prashanth Reddy RD  Group:  [id number] Cardiology  Referring Physician:  Abdulaziz Kaplan MD  Reading Physician:  BELÉN Vines MD [facility]    INDICATIONS: Endocarditis. PROCEDURE: This was a routine study. A transthoracic echocardiogram was  performed. The study included complete 2D imaging, M-mode, complete spectral  Doppler, and color Doppler. Intravenous contrast (Definity) was administered. Image quality was adequate. LEFT VENTRICLE: Size was at the upper limits of normal. Systolic function was  normal. Ejection fraction was estimated in the range of 55 % to 60 % to be  greater than 55 %.  There were no regional wall motion abnormalities. There   was  mild concentric hypertrophy. Left ventricular diastolic function parameters  were normal.    RIGHT VENTRICLE: The size was normal. Systolic function was normal. The  tricuspid jet envelope definition was inadequate for estimation of RV   systolic  pressure. LEFT ATRIUM: Size was normal.    RIGHT ATRIUM: Size was normal.    SYSTEMIC VEINS: IVC: The inferior vena cava was mildly dilated. The  respirophasic change in diameter was more than 50%. AORTIC VALVE: The valve was probably trileaflet. There was no evidence for  vegetation. There was no evidence for stenosis. There was no insufficiency. MITRAL VALVE: Valve structure was normal. There was no evidence for   vegetation. There was no evidence for stenosis. There was trivial regurgitation. TRICUSPID VALVE: The valve structure was normal. There was no evidence for  vegetation. There was no evidence for stenosis. There was trivial   regurgitation. PULMONIC VALVE: Although there was no diagnostic evidence for vegetation,   this  study is not adequate to completely exclude the possibility. Not well  visualized. There was no evidence for stenosis. There was no insufficiency. PERICARDIUM: There was no pericardial effusion. AORTA: The root exhibited normal size. SUMMARY:    -  Left ventricle: Size was at the upper limits of normal. Systolic function  was normal. Ejection fraction was estimated in the range of 55 % to 60 % to   be  greater than 55 %. There were no regional wall motion abnormalities. There   was  mild concentric hypertrophy. Left ventricular diastolic function parameters  were normal.    -  Inferior vena cava, hepatic veins: The inferior vena cava was mildly  dilated. The respirophasic change in diameter was more than 50%. -  Aortic valve: There was no evidence for vegetation.    -  Mitral valve: There was no evidence for vegetation.    -  Tricuspid valve: There was trivial regurgitation. There was no evidence   for  vegetation. SYSTEM MEASUREMENT TABLES    2D mode  LA Dimension (2D): 4.2 cm  Left Atrium Systolic Volume Index; Method of Disks, Biplane; 2D mode;: 20.4  ml/m2  IVS/LVPW (2D): 0.9  IVSd (2D): 1.2 cm  LVIDd (2D): 5.5 cm  LVIDs (2D): 3.3 cm  LVOT Area (2D): 3.5 cm2  LVPWd (2D): 1.3 cm    Tissue Doppler Imaging  LV Peak Early Don Tissue Zach; Lateral MA (TDI): 11 cm/s  LV Peak Early Don Tissue Zach; Medial MA (TDI): 8 cm/s    Unspecified Scan Mode  Peak Grad; Mean; Antegrade Flow: 14 mm[Hg]  Vmax; Antegrade Flow: 190 cm/s  LVOT Diam: 2.1 cm  MV Peak Zach/LV Peak Tissue Zach E-Wave; Lateral MA: 10.4  MV Peak Zach/LV Peak Tissue Zach E-Wave; Medial MA: 14.3    Prepared and signed by    BELÉN Son MD [facility]  Signed 29-Oct-2018 15:55:39         Discharge Exam:  Visit Vitals  /82 (BP 1 Location: Left arm, BP Patient Position: At rest)   Pulse 74   Temp 98.3 °F (36.8 °C)   Resp 18   Ht 6' (1.829 m)   Wt (!) 172.4 kg (380 lb)   SpO2 96%   BMI 51.54 kg/m²     Patient Vitals for the past 24 hrs:   Temp Pulse Resp BP SpO2   11/02/18 0721 98.3 °F (36.8 °C) 74 18 152/82 96 %   11/02/18 0428 97.9 °F (36.6 °C) 76 18 147/68 94 %   11/02/18 0039 98.7 °F (37.1 °C) 75 18 125/71 97 %   11/01/18 1914 99.3 °F (37.4 °C) 77 17 139/83 97 %   11/01/18 1507 98.6 °F (37 °C) 73 17 164/80 95 %   11/01/18 1125 98.6 °F (37 °C) 75 18 135/75 98 %       General appearance: alert, cooperative, no distress, appears stated age, morbidly obese  Lungs: clear to auscultation bilaterally  Heart: regular rate and rhythm, S1, S2 normal, no murmur, click, rub or gallop  Abdomen: soft, non-tender.  Bowel sounds normal. 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 Qty: 30 Dose, Refills: 0      metroNIDAZOLE (FLAGYL) 500 mg tablet Take 1 Tab by mouth every twelve (12) hours for 35 days. Qty: 70 Tab, Refills: 0      gabapentin (NEURONTIN) 100 mg capsule Take 1 Cap by mouth three (3) times daily as needed. Qty: 90 Cap, Refills: 0         CONTINUE these medications which have NOT CHANGED    Details   irbesartan (AVAPRO) 300 mg tablet Take 300 mg by mouth nightly. amLODIPine (NORVASC) 10 mg tablet Take  by mouth daily. hydroCHLOROthiazide (HYDRODIURIL) 25 mg tablet Take 25 mg by mouth daily. levothyroxine (SYNTHROID) 75 mcg tablet Take 75 mcg by mouth Daily (before breakfast). insulin aspart (NOVOLOG) 100 unit/mL injection by SubCUTAneous route Before breakfast, lunch, and dinner. Indications: sliding scale insulin      insulin NPH/insulin regular (NOVOLIN 70/30) 100 unit/mL (70-30) injection 50 Units by SubCUTAneous route two (2) times a day. Indications: 1/2 of usual am dose of insulin on the dos. omeprazole (PRILOSEC) 40 mg capsule Take 40 mg by mouth every evening. fenofibrate (TRICOR) 160 mg tablet Take 160 mg by mouth every morning. aspirin 81 mg chewable tablet Take 81 mg by mouth every morning.      multivitamin (ONE A DAY) tablet Take 1 Tab by mouth every evening. calcium citrate-vitamin d3 (CITRACAL + D) 315-200 mg-unit tab Take 1 Tab by mouth two (2) times a day. Activity: Activity as tolerated  Diet: Diabetic Diet  Wound Care: Packing per ortho recs- to follow wound care in [place], [place]    OPAT orders:   1) ceftriaxone IV 2g Q 24 with EOT 12/7/18         2) Routine PICC Care         3) Labs Q Monday:  sed rate, CRP, CBC with Differential and Creatinine                             4) F/o Appointment with Dr. Mirta Beverly in [place], [place] on 12/4/18. Amparo Garcia will follow while on OPAT.     Follow-up Follow-up Appointments   Procedures    FOLLOW UP VISIT Appointment in: Other ([address]) 1. ID doctor to follow in PATIENTS [facility] named Dr. Joe Velez [phone] 2. [facility] [phone] and their fax is [phone] (IV abx and PICC) 3. [facility] [phone]. Fax . ..     1.  ID doctor to follow in PATIENTS [facility] named Dr. Joe Velez [phone]  2. [facility] [phone] and their fax is [phone] (IV abx and PICC)  3. [facility] [phone]. Fax # [phone]. Rianna Lau with  clinic called back with an appt for 11-5-18 at 1:00pm     Standing Status:   Standing     Number of Occurrences:   1     Order Specific Question:   Appointment in     Answer: Other (Specify)   ·   ·   Time spent to discharge patient 35 minutes  Signed:  Zhen Sanders.  Bryce Collins MD  11/2/2018  8:52 AM

## 2018-11-07 LAB
BACTERIA SPEC CULT: NORMAL
SERVICE CMNT-IMP: NORMAL